# Patient Record
Sex: MALE | Race: WHITE | NOT HISPANIC OR LATINO | Employment: FULL TIME | URBAN - METROPOLITAN AREA
[De-identification: names, ages, dates, MRNs, and addresses within clinical notes are randomized per-mention and may not be internally consistent; named-entity substitution may affect disease eponyms.]

---

## 2023-12-12 ENCOUNTER — OFFICE VISIT (OUTPATIENT)
Dept: BEHAVIORAL HEALTH | Facility: CLINIC | Age: 58
End: 2023-12-12
Payer: COMMERCIAL

## 2023-12-12 VITALS
WEIGHT: 221.1 LBS | BODY MASS INDEX: 32.75 KG/M2 | HEIGHT: 69 IN | HEART RATE: 93 BPM | DIASTOLIC BLOOD PRESSURE: 76 MMHG | TEMPERATURE: 98.2 F | SYSTOLIC BLOOD PRESSURE: 115 MMHG | RESPIRATION RATE: 16 BRPM

## 2023-12-12 DIAGNOSIS — Z79.899 HIGH RISK MEDICATION USE: ICD-10-CM

## 2023-12-12 DIAGNOSIS — R47.9 SPEECH DISTURBANCE, UNSPECIFIED TYPE: ICD-10-CM

## 2023-12-12 DIAGNOSIS — F41.1 GAD (GENERALIZED ANXIETY DISORDER): ICD-10-CM

## 2023-12-12 DIAGNOSIS — F32.A DEPRESSION, UNSPECIFIED DEPRESSION TYPE: ICD-10-CM

## 2023-12-12 PROCEDURE — 99214 OFFICE O/P EST MOD 30 MIN: CPT

## 2023-12-12 RX ORDER — DULOXETIN HYDROCHLORIDE 60 MG/1
60 CAPSULE, DELAYED RELEASE ORAL DAILY
COMMUNITY
End: 2023-12-12 | Stop reason: SDUPTHER

## 2023-12-12 RX ORDER — CLONAZEPAM 2 MG/1
2 TABLET ORAL 2 TIMES DAILY
Qty: 60 TABLET | Refills: 1 | Status: SHIPPED | OUTPATIENT
Start: 2024-01-01 | End: 2024-02-13 | Stop reason: WASHOUT

## 2023-12-12 RX ORDER — CLONAZEPAM 2 MG/1
2 TABLET ORAL 2 TIMES DAILY
COMMUNITY
End: 2023-12-12 | Stop reason: SDUPTHER

## 2023-12-12 RX ORDER — DULOXETIN HYDROCHLORIDE 60 MG/1
60 CAPSULE, DELAYED RELEASE ORAL DAILY
Qty: 90 CAPSULE | Refills: 0 | Status: SHIPPED | OUTPATIENT
Start: 2023-12-12 | End: 2024-02-13 | Stop reason: SDUPTHER

## 2023-12-12 NOTE — PROGRESS NOTES
"Subjective   Patient ID: Esvin Gabriel is a 58 y.o. male who presents for Anxiety, Depression, and Med Management Last visit with this writer on 9/12/23.     HPI  When asked how he has been doing patient reports \"Anxiety's been running rampant\". Change in anxiety symptoms noted 3 weeks ago. Increasing stress reported at work - working over-time shifts. Joined [employer] and has established with associated [employer]. Seeing routinely which has been going well. Walking more often - trying to get back to exercise regiment at [employer]. \"Over thinking\" present intermittently - working on coping skills with counselor. Patient reports he wishes to continue as planned with reduction in clonazepam dose but is nervous about how this process will take place. Discussed that dose would be slowly reduced over-time with appointments to monitor progress and response which he finds reassuring.     Pain Improving with exercise - no impact on work. Intermittent N/T   Seeing an orthopedist for back pain/knee pain - epidural injections have been helpful.      -- MDD --   Mood: \"I'm okay\"   No anhedonia - enjoying time with grand kids and wife   Energy levels: adequate   Focus: adequate   Sleep: Has returned to third shift, sleeping well most days.   Appetite: stable - limiting caffeine and soda in general.   + weight gain  No hopelessness  No SI/HI      -- Anxiety --  + excessive worrying when not distracted   - \"doing a lot of stuff that keep me distracted\"   Restlessness/keyed up or on edge: endorses, intermittent   Irritability: intermittent   Sleep disturbance: denies   No specific panic attacks reported   Somatic complaints: infrequent eye burning/pain.      ETOH: denies   Tobacco - denies   MJ use- denies   Caffeine - 2-3 sodas daily   No Illicit drug use     Objective   Physical Exam  Constitutional:       Appearance: Normal appearance.   Neurological:      Mental Status: He is oriented to person, place, and time.     General " "Appearance: Well groomed, appropriate eye contact  Attitude/Behavior: Cooperative  Motor: No psychomotor agitation or retardation, no tremor or other abnormal movements  Speech: Normal rate, volume, prosody (+ disrupted speech fluency)  Gait/Station: WFL - Within functional limits  Mood: \"I'm okay\"  Affect: Constricted  Thought Process: Linear, goal directed, Circumstantial  Thought Associations: No loosening of associations  Thought Content: Normal  Perception: No perceptual abnormalities noted  Sensorium: Alert and oriented to person, place, time and situation  Insight: Fair  Judgement: Fair  Cognition: Cognitively intact to conversational testing with respect to attention, orientation, fund of knowledge, recent and remote memory, and language    Lab Review:   not applicable    Assessment/Plan   Problem List Items Addressed This Visit             ICD-10-CM    BETTY (generalized anxiety disorder) F41.1    Depression F32.A    Speech abnormality R47.9   Esvin Gabriel is a 57 year old male currently residing in [place], [place] w/ his mother.  from his wife w/ three adult children. 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 Initially sought care for depression and anxiety 4 years ago with Dr. Monge.      DSM-5 Diagnosis  BETTY  Depressive disorder, unspecified   H/o somatic symptoms   Speech abnormality      Current Medications   Duloxetine 60mg PO daily   Clonazepam 2mg BID - last filled on 12/3/23 for 60 tablets   - drug screen completed in November of 2022   - Completed benzo agreement - scanned into chart   - Reviewed [place]AR[place] on 12/12/23, no discrepancies or concerns noted.  taking Clonazepam on a strict routine - denies any cognitive issues at work or driving.   *medications continued unchanged from former provider Dr. Monge      - Anxiety symptoms fluctuating in frequency/intensity   - mood/sleep/appetite stable   - Patient and this writer mutually agreed to begin reducing " clonazepam dose to 3mg daily at next visit.   - Encouraged patient to continue working with counselor and developing coping skills over the next two months   - encouraged patient to continue making healthy choices  - c/w Duloxetine 60mg per day, c/w Clonazepam 2mg BID   - encouraged patient to communicate any questions, concerns or side effects if recognized. Follow up appointment in 2 months or sooner if needed  - patient is agreeable to the plan detailed above.  - patient is aware this provider is leaving  March 2024. He wishes to continue care with  psychiatry     Past medication trials  Gabapentin - not helpful   Side effects noted on higher doses Duloxetine  Buspar - stopped d/t side effects      Start time 11:32pm   End time 11:57am   Prep/charting time 7min  Total time 32min

## 2023-12-12 NOTE — PATIENT INSTRUCTIONS
Continue Duloxetine and Clonazepam as prescribed  Follow up visit in two months or sooner if needed  Please call the office with any questions or concerns

## 2024-02-12 ENCOUNTER — LAB (OUTPATIENT)
Dept: LAB | Facility: LAB | Age: 59
End: 2024-02-12
Payer: COMMERCIAL

## 2024-02-12 DIAGNOSIS — Z79.899 HIGH RISK MEDICATION USE: ICD-10-CM

## 2024-02-12 LAB
AMPHETAMINES UR QL SCN: NORMAL
BARBITURATES UR QL SCN: NORMAL
BENZODIAZ UR QL SCN: NORMAL
BZE UR QL SCN: NORMAL
CANNABINOIDS UR QL SCN: NORMAL
FENTANYL+NORFENTANYL UR QL SCN: NORMAL
OPIATES UR QL SCN: NORMAL
OXYCODONE+OXYMORPHONE UR QL SCN: NORMAL
PCP UR QL SCN: NORMAL

## 2024-02-12 PROCEDURE — 80307 DRUG TEST PRSMV CHEM ANLYZR: CPT

## 2024-02-12 PROCEDURE — 80346 BENZODIAZEPINES1-12: CPT

## 2024-02-13 ENCOUNTER — OFFICE VISIT (OUTPATIENT)
Dept: BEHAVIORAL HEALTH | Facility: CLINIC | Age: 59
End: 2024-02-13
Payer: COMMERCIAL

## 2024-02-13 VITALS
DIASTOLIC BLOOD PRESSURE: 76 MMHG | WEIGHT: 224.2 LBS | SYSTOLIC BLOOD PRESSURE: 132 MMHG | HEART RATE: 91 BPM | TEMPERATURE: 98.6 F | HEIGHT: 69 IN | RESPIRATION RATE: 16 BRPM | BODY MASS INDEX: 33.21 KG/M2

## 2024-02-13 DIAGNOSIS — F32.A DEPRESSION, UNSPECIFIED DEPRESSION TYPE: ICD-10-CM

## 2024-02-13 DIAGNOSIS — R47.9 SPEECH DISTURBANCE, UNSPECIFIED TYPE: ICD-10-CM

## 2024-02-13 DIAGNOSIS — F41.1 GAD (GENERALIZED ANXIETY DISORDER): ICD-10-CM

## 2024-02-13 PROCEDURE — 99213 OFFICE O/P EST LOW 20 MIN: CPT

## 2024-02-13 RX ORDER — CLONAZEPAM 2 MG/1
3 TABLET ORAL SEE ADMIN INSTRUCTIONS
Qty: 45 TABLET | Refills: 0 | Status: SHIPPED | OUTPATIENT
Start: 2024-04-11 | End: 2024-05-31 | Stop reason: ALTCHOICE

## 2024-02-13 RX ORDER — CLONAZEPAM 2 MG/1
3 TABLET ORAL SEE ADMIN INSTRUCTIONS
Qty: 45 TABLET | Refills: 0 | Status: SHIPPED | OUTPATIENT
Start: 2024-02-13 | End: 2024-05-31 | Stop reason: ALTCHOICE

## 2024-02-13 RX ORDER — DULOXETIN HYDROCHLORIDE 60 MG/1
60 CAPSULE, DELAYED RELEASE ORAL DAILY
Qty: 90 CAPSULE | Refills: 0 | Status: SHIPPED | OUTPATIENT
Start: 2024-02-13 | End: 2024-04-19 | Stop reason: SDUPTHER

## 2024-02-13 RX ORDER — CLONAZEPAM 2 MG/1
3 TABLET ORAL SEE ADMIN INSTRUCTIONS
Qty: 45 TABLET | Refills: 0 | Status: SHIPPED | OUTPATIENT
Start: 2024-03-13 | End: 2024-05-31 | Stop reason: ALTCHOICE

## 2024-02-13 NOTE — PROGRESS NOTES
"Subjective   Patient ID: Esvin Gabriel is a 58 y.o. male who presents for Anxiety, Depression, and Med Management Last visit with this writer on 12/12/23.    HPI  Patient arrived on-time for in-person visit. A/Ox3. When asked how he has been doing patient reports he is feeling \"okay\". He reports a recent fall at work while fixing a machine. Shoulder/wrist pain improving since. Back to work full-time. Taking OTC Advil every other day. Anxiety symptoms remain present but overall stable - \"Not stressing too much\".   T[facility] which has been helpful. Walking more often and doing pool exercises most weeks which has been helpful. Enjoying time with grand daughters. Patient reports he wishes to continue as planned with reduction in clonazepam dose. Discussed that dose would be slowly reduced over-time with appointments to monitor progress and response which he finds reassuring.     -- Depression --   Mood: \"pretty good\"   - brief period of irritability with conflict with son   No anhedonia - enjoying time with grand kids and wife   Energy levels: adequate   Focus: adequate   Sleep: disrupted sleep 2 nights/week the past 3 weeks   - working third shift   - difficultly falling asleep with pain/anxiety   Appetite: stable - limiting caffeine and soda in general.   + weight gain  No hopelessness  No SI/HI      -- Anxiety --  \"Pretty good\"   + excessive worrying/anxiety with stressors (conflict with son, stress at work, financial concerns, recent injury)  + Restlessness/keyed up or on edge: endorses, intermittent   + Irritability: infrequently   + Muscle tension   + Sleep disturbance: endorses   No specific panic attacks reported   Somatic complaints: infrequent eye burning/pain.      ETOH: denies   Tobacco - denies   MJ use- denies   Caffeine - 2-3 sodas daily   No Illicit drug use     Objective   Physical Exam  Constitutional:       Appearance: Normal appearance.   Neurological:      " "Mental Status: He is oriented to person, place, and time.     General Appearance: Well groomed, appropriate eye contact  Attitude/Behavior: Cooperative  Motor: No psychomotor agitation or retardation, no tremor or other abnormal movements  Speech: Other: (comment) (normal rate/volume. + disrupted fluency)  Gait/Station: WFL - Within functional limits  Mood: \"pretty good\"  Affect: Constricted  Thought Process: Linear, goal directed, Circumstantial  Thought Associations: No loosening of associations  Thought Content: Normal  Perception: No perceptual abnormalities noted  Sensorium: Alert and oriented to person, place, time and situation  Insight: Intact  Judgement: Intact  Cognition: Cognitively intact to conversational testing with respect to attention, orientation, fund of knowledge, recent and remote memory, and language    Lab Review:   not applicable    Assessment/Plan   Problem List Items Addressed This Visit             ICD-10-CM    BETTY (generalized anxiety disorder) F41.1    Depression F32.A    Speech abnormality R47.9   Esvin Gabriel is a 57 year old male currently residing in [place], [place] w/ his mother.  from his wife w/ three adult children. 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 Initially sought care for depression and anxiety 4 years ago with Dr. Monge.      DSM-5 Diagnosis  BETTY  Depressive disorder, unspecified   H/o somatic symptoms   Speech abnormality      Current Medications   Duloxetine 60mg PO daily   Clonazepam 2mg BID - last filled on 2/4/24 for 60tabs  - drug screen completed in 2/12/24   - Completed benzo agreement - scanned into chart (2023)   - Reviewed OARRS on 12/12/23, no discrepancies or concerns noted.  taking Clonazepam on a strict routine - denies any cognitive issues at work or driving.   *medications continued unchanged from former provider Dr. Monge      - Anxiety symptoms fluctuating in frequency/intensity with stressors but " overall stable. Functioning well at work and home.  - Mood is stable. No SI/HI.   - as previously discussed will reduce Clonazepam dose to 3mg total per day (1mg or half tablet in the am and full tablet/2mg in the PM)  - Patient is agreeable to reduction in dose and we discussed the importance of relying on coping skills as dose is reduced.   - Encouraged patient to continue working with counselor and developing coping skills.   - encouraged patient to continue making healthy choices  - c/w Duloxetine 60mg per day   - reduce clonazepam dose to 3mg daily in two divided doses  - discussed buying a pill cutter to accurately split morning tablet   - encouraged patient to communicate any questions, concerns or side effects if recognized.   - patient is aware this provider is leaving  March 2024. He wishes to continue care with  psychiatry, scheduled with Alba Phan   - patient is agreeable to plan detailed above.     Past medication trials  Gabapentin - not helpful   Side effects (fatigue, headaches) noted on higher doses Duloxetine  Buspar - stopped d/t side effects (headaches)     Start time 9:34am   End time 9:58am   Prep/charting time 5min   Total time 29min

## 2024-02-13 NOTE — PATIENT INSTRUCTIONS
Continue duloxetine 60mg daily   Reduce Clonazepam dose to 3mg total per day (1/2 tablet (1mg) in the morning and full tablet (2mg) in the evening)

## 2024-02-19 LAB
1OH-MIDAZOLAM UR CFM-MCNC: <25 NG/ML
7AMINOCLONAZEPAM UR CFM-MCNC: 172 NG/ML
A-OH ALPRAZ UR CFM-MCNC: <25 NG/ML
ALPRAZ UR CFM-MCNC: <25 NG/ML
CHLORDIAZEP UR CFM-MCNC: <25 NG/ML
CLONAZEPAM UR CFM-MCNC: <25 NG/ML
DIAZEPAM UR CFM-MCNC: <25 NG/ML
LORAZEPAM UR CFM-MCNC: <25 NG/ML
MIDAZOLAM UR CFM-MCNC: <25 NG/ML
NORDIAZEPAM UR CFM-MCNC: <25 NG/ML
OXAZEPAM UR CFM-MCNC: <25 NG/ML
TEMAZEPAM UR CFM-MCNC: <25 NG/ML

## 2024-04-18 ENCOUNTER — APPOINTMENT (OUTPATIENT)
Dept: BEHAVIORAL HEALTH | Facility: CLINIC | Age: 59
End: 2024-04-18
Payer: COMMERCIAL

## 2024-04-19 ENCOUNTER — OFFICE VISIT (OUTPATIENT)
Dept: BEHAVIORAL HEALTH | Facility: CLINIC | Age: 59
End: 2024-04-19
Payer: COMMERCIAL

## 2024-04-19 VITALS
HEART RATE: 81 BPM | DIASTOLIC BLOOD PRESSURE: 69 MMHG | BODY MASS INDEX: 32.98 KG/M2 | WEIGHT: 222.7 LBS | TEMPERATURE: 98.4 F | SYSTOLIC BLOOD PRESSURE: 105 MMHG | HEIGHT: 69 IN | RESPIRATION RATE: 16 BRPM

## 2024-04-19 DIAGNOSIS — F43.0 PANIC ATTACK DUE TO EXCEPTIONAL STRESS: Primary | ICD-10-CM

## 2024-04-19 DIAGNOSIS — F41.0 PANIC ATTACK DUE TO EXCEPTIONAL STRESS: Primary | ICD-10-CM

## 2024-04-19 DIAGNOSIS — F33.0 MILD EPISODE OF RECURRENT MAJOR DEPRESSIVE DISORDER (CMS-HCC): ICD-10-CM

## 2024-04-19 DIAGNOSIS — F42.8 OBSESSIVE THINKING: ICD-10-CM

## 2024-04-19 DIAGNOSIS — F43.10 COMPLEX POSTTRAUMATIC STRESS DISORDER: ICD-10-CM

## 2024-04-19 DIAGNOSIS — F41.1 GAD (GENERALIZED ANXIETY DISORDER): ICD-10-CM

## 2024-04-19 PROCEDURE — 99417 PROLNG OP E/M EACH 15 MIN: CPT | Performed by: NURSE PRACTITIONER

## 2024-04-19 PROCEDURE — 99215 OFFICE O/P EST HI 40 MIN: CPT | Performed by: NURSE PRACTITIONER

## 2024-04-19 RX ORDER — MULTIVITAMIN
1 TABLET ORAL DAILY
COMMUNITY
Start: 2022-10-20

## 2024-04-19 RX ORDER — CLONAZEPAM 2 MG/1
2 TABLET ORAL 2 TIMES DAILY
Qty: 60 TABLET | Refills: 0 | Status: SHIPPED | OUTPATIENT
Start: 2024-05-05 | End: 2024-05-31 | Stop reason: SDUPTHER

## 2024-04-19 RX ORDER — DULOXETIN HYDROCHLORIDE 60 MG/1
60 CAPSULE, DELAYED RELEASE ORAL DAILY
Qty: 90 CAPSULE | Refills: 0 | Status: SHIPPED | OUTPATIENT
Start: 2024-05-10 | End: 2024-05-31 | Stop reason: DRUGHIGH

## 2024-04-19 RX ORDER — DULOXETIN HYDROCHLORIDE 30 MG/1
30 CAPSULE, DELAYED RELEASE ORAL DAILY
Qty: 60 CAPSULE | Refills: 0 | Status: SHIPPED | OUTPATIENT
Start: 2024-04-19 | End: 2024-05-31 | Stop reason: DRUGHIGH

## 2024-04-19 ASSESSMENT — PATIENT HEALTH QUESTIONNAIRE - PHQ9
2. FEELING DOWN, DEPRESSED OR HOPELESS: SEVERAL DAYS
10. IF YOU CHECKED OFF ANY PROBLEMS, HOW DIFFICULT HAVE THESE PROBLEMS MADE IT FOR YOU TO DO YOUR WORK, TAKE CARE OF THINGS AT HOME, OR GET ALONG WITH OTHER PEOPLE: NOT DIFFICULT AT ALL
7. TROUBLE CONCENTRATING ON THINGS, SUCH AS READING THE NEWSPAPER OR WATCHING TELEVISION: SEVERAL DAYS
4. FEELING TIRED OR HAVING LITTLE ENERGY: NEARLY EVERY DAY
9. THOUGHTS THAT YOU WOULD BE BETTER OFF DEAD, OR OF HURTING YOURSELF: NOT AT ALL
6. FEELING BAD ABOUT YOURSELF - OR THAT YOU ARE A FAILURE OR HAVE LET YOURSELF OR YOUR FAMILY DOWN: NOT AT ALL
8. MOVING OR SPEAKING SO SLOWLY THAT OTHER PEOPLE COULD HAVE NOTICED. OR THE OPPOSITE, BEING SO FIGETY OR RESTLESS THAT YOU HAVE BEEN MOVING AROUND A LOT MORE THAN USUAL: MORE THAN HALF THE DAYS
1. LITTLE INTEREST OR PLEASURE IN DOING THINGS: MORE THAN HALF THE DAYS
5. POOR APPETITE OR OVEREATING: NOT AT ALL
3. TROUBLE FALLING OR STAYING ASLEEP OR SLEEPING TOO MUCH: MORE THAN HALF THE DAYS

## 2024-04-19 ASSESSMENT — ANXIETY QUESTIONNAIRES
6. BECOMING EASILY ANNOYED OR IRRITABLE: NEARLY EVERY DAY
7. FEELING AFRAID AS IF SOMETHING AWFUL MIGHT HAPPEN: MORE THAN HALF THE DAYS
3. WORRYING TOO MUCH ABOUT DIFFERENT THINGS: NEARLY EVERY DAY
1. FEELING NERVOUS, ANXIOUS, OR ON EDGE: MORE THAN HALF THE DAYS
4. TROUBLE RELAXING: NEARLY EVERY DAY
GAD7 TOTAL SCORE: 17
2. NOT BEING ABLE TO STOP OR CONTROL WORRYING: MORE THAN HALF THE DAYS
IF YOU CHECKED OFF ANY PROBLEMS ON THIS QUESTIONNAIRE, HOW DIFFICULT HAVE THESE PROBLEMS MADE IT FOR YOU TO DO YOUR WORK, TAKE CARE OF THINGS AT HOME, OR GET ALONG WITH OTHER PEOPLE: SOMEWHAT DIFFICULT
5. BEING SO RESTLESS THAT IT IS HARD TO SIT STILL: MORE THAN HALF THE DAYS
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
## 2024-04-21 PROBLEM — F43.0 PANIC ATTACK DUE TO EXCEPTIONAL STRESS: Status: ACTIVE | Noted: 2024-04-21

## 2024-04-21 PROBLEM — F41.0 PANIC ATTACK DUE TO EXCEPTIONAL STRESS: Status: ACTIVE | Noted: 2024-04-21

## 2024-04-21 PROBLEM — F43.10 COMPLEX POSTTRAUMATIC STRESS DISORDER: Status: ACTIVE | Noted: 2024-04-21

## 2024-04-21 PROBLEM — F42.8 OBSESSIVE THINKING: Status: ACTIVE | Noted: 2024-04-21

## 2024-04-21 ASSESSMENT — ENCOUNTER SYMPTOMS
MUSCULOSKELETAL NEGATIVE: 1
CARDIOVASCULAR NEGATIVE: 1
NERVOUS/ANXIOUS: 1
GASTROINTESTINAL NEGATIVE: 1

## 2024-05-02 ENCOUNTER — APPOINTMENT (OUTPATIENT)
Dept: BEHAVIORAL HEALTH | Facility: CLINIC | Age: 59
End: 2024-05-02
Payer: COMMERCIAL

## 2024-05-31 ENCOUNTER — TELEMEDICINE (OUTPATIENT)
Dept: BEHAVIORAL HEALTH | Facility: CLINIC | Age: 59
End: 2024-05-31
Payer: COMMERCIAL

## 2024-05-31 VITALS
SYSTOLIC BLOOD PRESSURE: 116 MMHG | DIASTOLIC BLOOD PRESSURE: 71 MMHG | TEMPERATURE: 98.2 F | WEIGHT: 222.9 LBS | RESPIRATION RATE: 16 BRPM | HEART RATE: 79 BPM | BODY MASS INDEX: 33.02 KG/M2 | HEIGHT: 69 IN

## 2024-05-31 DIAGNOSIS — F43.10 COMPLEX POSTTRAUMATIC STRESS DISORDER: ICD-10-CM

## 2024-05-31 DIAGNOSIS — F41.0 PANIC ATTACK DUE TO EXCEPTIONAL STRESS: ICD-10-CM

## 2024-05-31 DIAGNOSIS — F43.0 PANIC ATTACK DUE TO EXCEPTIONAL STRESS: ICD-10-CM

## 2024-05-31 DIAGNOSIS — F33.0 MILD EPISODE OF RECURRENT MAJOR DEPRESSIVE DISORDER (CMS-HCC): ICD-10-CM

## 2024-05-31 DIAGNOSIS — F41.1 GAD (GENERALIZED ANXIETY DISORDER): Primary | ICD-10-CM

## 2024-05-31 DIAGNOSIS — F42.8 OBSESSIVE THINKING: ICD-10-CM

## 2024-05-31 PROBLEM — F32.A CHRONIC DEPRESSION: Status: RESOLVED | Noted: 2023-12-12 | Resolved: 2024-05-31

## 2024-05-31 LAB
AMPHETAMINES UR QL SCN: NORMAL
BARBITURATES UR QL SCN: NORMAL
BZE UR QL SCN: NORMAL
CANNABINOIDS UR QL SCN: NORMAL
CREAT UR-MCNC: 188.4 MG/DL (ref 20–370)
PCP UR QL SCN: NORMAL

## 2024-05-31 PROCEDURE — 80307 DRUG TEST PRSMV CHEM ANLYZR: CPT

## 2024-05-31 PROCEDURE — 80358 DRUG SCREENING METHADONE: CPT

## 2024-05-31 PROCEDURE — 82570 ASSAY OF URINE CREATININE: CPT

## 2024-05-31 PROCEDURE — 99214 OFFICE O/P EST MOD 30 MIN: CPT | Performed by: NURSE PRACTITIONER

## 2024-05-31 PROCEDURE — 80368 SEDATIVE HYPNOTICS: CPT

## 2024-05-31 PROCEDURE — 80354 DRUG SCREENING FENTANYL: CPT

## 2024-05-31 PROCEDURE — 80361 OPIATES 1 OR MORE: CPT

## 2024-05-31 PROCEDURE — 80373 DRUG SCREENING TRAMADOL: CPT

## 2024-05-31 PROCEDURE — 80346 BENZODIAZEPINES1-12: CPT

## 2024-05-31 PROCEDURE — 80365 DRUG SCREENING OXYCODONE: CPT

## 2024-05-31 RX ORDER — DULOXETIN HYDROCHLORIDE 20 MG/1
20 CAPSULE, DELAYED RELEASE ORAL DAILY
Qty: 60 CAPSULE | Refills: 0 | Status: SHIPPED | OUTPATIENT
Start: 2024-05-31 | End: 2024-07-30

## 2024-05-31 RX ORDER — CLONAZEPAM 2 MG/1
2 TABLET ORAL 2 TIMES DAILY
Qty: 60 TABLET | Refills: 1 | Status: SHIPPED | OUTPATIENT
Start: 2024-06-04 | End: 2024-08-03

## 2024-05-31 RX ORDER — DULOXETIN HYDROCHLORIDE 60 MG/1
60 CAPSULE, DELAYED RELEASE ORAL DAILY
Qty: 90 CAPSULE | Refills: 3 | Status: SHIPPED | OUTPATIENT
Start: 2024-05-31 | End: 2025-05-31

## 2024-05-31 ASSESSMENT — PATIENT HEALTH QUESTIONNAIRE - PHQ9
5. POOR APPETITE OR OVEREATING: SEVERAL DAYS
2. FEELING DOWN, DEPRESSED OR HOPELESS: MORE THAN HALF THE DAYS
9. THOUGHTS THAT YOU WOULD BE BETTER OFF DEAD, OR OF HURTING YOURSELF: NOT AT ALL
1. LITTLE INTEREST OR PLEASURE IN DOING THINGS: SEVERAL DAYS
10. IF YOU CHECKED OFF ANY PROBLEMS, HOW DIFFICULT HAVE THESE PROBLEMS MADE IT FOR YOU TO DO YOUR WORK, TAKE CARE OF THINGS AT HOME, OR GET ALONG WITH OTHER PEOPLE: SOMEWHAT DIFFICULT
3. TROUBLE FALLING OR STAYING ASLEEP OR SLEEPING TOO MUCH: NEARLY EVERY DAY
7. TROUBLE CONCENTRATING ON THINGS, SUCH AS READING THE NEWSPAPER OR WATCHING TELEVISION: SEVERAL DAYS
6. FEELING BAD ABOUT YOURSELF - OR THAT YOU ARE A FAILURE OR HAVE LET YOURSELF OR YOUR FAMILY DOWN: NOT AT ALL
4. FEELING TIRED OR HAVING LITTLE ENERGY: SEVERAL DAYS
8. MOVING OR SPEAKING SO SLOWLY THAT OTHER PEOPLE COULD HAVE NOTICED. OR THE OPPOSITE, BEING SO FIGETY OR RESTLESS THAT YOU HAVE BEEN MOVING AROUND A LOT MORE THAN USUAL: SEVERAL DAYS

## 2024-05-31 ASSESSMENT — ANXIETY QUESTIONNAIRES
GAD7 TOTAL SCORE: 13
IF YOU CHECKED OFF ANY PROBLEMS ON THIS QUESTIONNAIRE, HOW DIFFICULT HAVE THESE PROBLEMS MADE IT FOR YOU TO DO YOUR WORK, TAKE CARE OF THINGS AT HOME, OR GET ALONG WITH OTHER PEOPLE: SOMEWHAT DIFFICULT
5. BEING SO RESTLESS THAT IT IS HARD TO SIT STILL: MORE THAN HALF THE DAYS
7. FEELING AFRAID AS IF SOMETHING AWFUL MIGHT HAPPEN: SEVERAL DAYS
3. WORRYING TOO MUCH ABOUT DIFFERENT THINGS: NEARLY EVERY DAY
6. BECOMING EASILY ANNOYED OR IRRITABLE: SEVERAL DAYS
4. TROUBLE RELAXING: MORE THAN HALF THE DAYS
1. FEELING NERVOUS, ANXIOUS, OR ON EDGE: SEVERAL DAYS
2. NOT BEING ABLE TO STOP OR CONTROL WORRYING: NEARLY EVERY DAY

## 2024-05-31 ASSESSMENT — ENCOUNTER SYMPTOMS
NERVOUS/ANXIOUS: 1
CONSTITUTIONAL NEGATIVE: 1
DYSPHORIC MOOD: 1
AGITATION: 1

## 2024-05-31 NOTE — PROGRESS NOTES
"Adult Ambulatory Psychiatry Progress Note      Assessment/Plan     Impression:  Esvin Gabriel is a 58 y.o. male domiciled  (living separately) with 3 adult sons, employed as  who presents for follow up with CC of \"  I feel like the increase Duloxetine has left me feeling more jittery - not more nervous, but more like clenching or a feeling of biting down my teeth. I didn't have this problem on the lower dose. Other than that I have been feeling fine. I am walking more and have lost 2 lbs, me and my son and I do try to do that twice a week and that feels good.\"    Plan: Patient was cooperative yet anxious, edgy, rapidly speaking and needing interruptions and redirection. Indicated increased dose of Cymbalta improved his mood but left him feeling on edge, tight (clenching his teeth was a side effect) and did not like that feeling. Reviewed and agreed to reducing dose of Cymbalta and patient is onboard with slowly tapering Klonopin over time still but will review starting that process at next appt. No other changes to treatment plan.     Medication: Reduces Cymbalta 90mg to 80mg every day. Continues taking Klonopin 2mg twice daily for panic disorder.    Reviewed r/b/a, possible side effects of the medication. Client is aware about the benefit outweighs the risk.     Diagnoses and all orders for this visit:  BETTY (generalized anxiety disorder)  -     DULoxetine (Cymbalta) 20 mg DR capsule; Take 1 capsule (20 mg) by mouth once daily. Do not crush or chew.  -     DULoxetine (Cymbalta) 60 mg DR capsule; Take 1 capsule (60 mg) by mouth once daily.  Panic attack due to exceptional stress  -     Opiate/Opioid/Benzo Prescription Compliance  -     OOB Internal Tracking  -     clonazePAM (KlonoPIN) 2 mg tablet; Take 1 tablet (2 mg) by mouth 2 times a day. Do not fill before June 4, 2024.  Mild episode of recurrent major depressive disorder (CMS-HCC)  -     DULoxetine (Cymbalta) 20 mg DR capsule; Take 1 " capsule (20 mg) by mouth once daily. Do not crush or chew.  -     DULoxetine (Cymbalta) 60 mg DR capsule; Take 1 capsule (60 mg) by mouth once daily.  Obsessive thinking  -     DULoxetine (Cymbalta) 20 mg DR capsule; Take 1 capsule (20 mg) by mouth once daily. Do not crush or chew.  -     DULoxetine (Cymbalta) 60 mg DR capsule; Take 1 capsule (60 mg) by mouth once daily.  Complex posttraumatic stress disorder      Therapy: none    Other: n/a    Patient is reminded that if there is SI to call [phone] and get themselves to the closest ED for evaluation, otherwise contact me for other questions/concerns.     Subjective   HPI:  HPI:     Present Illness - anxiety, depression       Characteristics/Recent psychiatric symptoms (pertinent positives and negatives) - reports loving his grandkids as 'they help me forget everything. They run me ragged but that is why I want to come down on the Klonopin as I get older.' Admits the medications he is on, have been a game changer for him, especially the Klonopin however. Reports having been on it, people at work noticed how much calmer he has been with how he treats others, and including the Cymbalta as having been added on latter. Reports both depression and anxiety are overall stable with medications but 'some days can be triggered. Like my one son has issues with his job and that can bother me and I realize I can't worry about my kids problems as that is theirs to worry and fix.' Reports with working 3rd shift at work, he will get 7 hours of sleep (going to bed around 9am to 4:30pm) and 'honestly I don't feel too tired and I do get rested'. Reports his goal is to leave his job in July 2025 as he has been there for 30 years and wants to retire. Appetite is 'good'. Denies anger, but can get irritated over the small things like his 2 sons. Does admit to racing and some time obsessive thoughts with worrying about his youngest grown son's issues but is trying to let it go, 'but otherwise  I feel I am honestly good with that.' Reports still with his wife and working on their relationship, but she lives in their home and he lives with his mother.     Onset/timeframe - 3 weeks  Type - anxiety, depression  Duration - situational  Aggravating and/or relieving factors/triggers - increased dose of Cymbalta is helping with mood, but leaves him feeling more physically agitated (clenching of teeth)  Treatment and treatment changes (new meds, dosage increases or decreases, med compliance, therapy frequency, etc.) (Past and Recent) - Cymbalta 90mg QD, Klonopin 2mg BID. Reports having tried therapy (psychodynamic therapy) but gave up 'because people were never understanding me, not being in my shoes'.      Issues: Denies SI/HI/AVH currently.           Review of Systems   Constitutional: Negative.    HENT: Negative.     Psychiatric/Behavioral:  Positive for agitation and dysphoric mood. The patient is nervous/anxious.        OARRS:  Adarsh Robles, APRN-CNP on 5/31/2024 11:26 PM  I have personally reviewed the OARRS report for Esvin Gabriel. I have considered the risks of abuse, dependence, addiction and diversion    Is the patient prescribed a combination of a benzodiazepine and opioid?  No    Last Urine Drug Screen / ordered today: Yes  Recent Results (from the past 8760 hour(s))   Screen Opiate/Opioid/Benzo Prescription Compliance    Collection Time: 05/31/24  2:19 PM   Result Value Ref Range    Creatinine, Urine Random 188.4 20.0 - 370.0 mg/dL    Amphetamine Screen, Urine Presumptive Negative Presumptive Negative    Barbiturate Screen, Urine Presumptive Negative Presumptive Negative    Cannabinoid Screen, Urine Presumptive Negative Presumptive Negative    Cocaine Metabolite Screen, Urine Presumptive Negative Presumptive Negative    PCP Screen, Urine Presumptive Negative Presumptive Negative   Benzodiazepine Confirmation, Urine    Collection Time: 02/12/24  8:18 AM   Result Value Ref Range    Clonazepam <25 <25  ng/mL    7-Aminoclonazepam 172 (H) <25 ng/mL    Alprazolam <25 <25 ng/mL    Alpha-Hydroxyalprazolam <25 <25 ng/mL    Midazolam <25 <25 ng/mL    Alpha-Hydroxymidazolam <25 <25 ng/mL    Chlordiazepoxide <25 <25 ng/mL    Diazepam <25 <25 ng/mL    Nordiazepam <25 <25 ng/mL    Temazepam <25 <25 ng/mL    Oxazepam <25 <25 ng/mL    Lorazepam <25 <25 ng/mL   Drug Screen, Urine With Reflex to Confirmation    Collection Time: 02/12/24  8:18 AM   Result Value Ref Range    Amphetamine Screen, Urine Presumptive Negative Presumptive Negative    Barbiturate Screen, Urine Presumptive Negative Presumptive Negative    Benzodiazepines Screen, Urine Presumptive Negative Presumptive Negative    Cannabinoid Screen, Urine Presumptive Negative Presumptive Negative    Cocaine Metabolite Screen, Urine Presumptive Negative Presumptive Negative    Fentanyl Screen, Urine Presumptive Negative Presumptive Negative    Opiate Screen, Urine Presumptive Negative Presumptive Negative    Oxycodone Screen, Urine Presumptive Negative Presumptive Negative    PCP Screen, Urine Presumptive Negative Presumptive Negative     Results are as expected.         Controlled Substance Agreement:  Date of the Last Agreement: 04/19/2024  Reviewed Controlled Substance Agreement including but not limited to the benefits, risks, and alternatives to treatment with a Controlled Substance medication(s).    Benzodiazepines:  What is the patient's goal of therapy? Stable anxiety/panic feeling  Is this being achieved with current treatment? yes    Activities of Daily Living:   Is your overall impression that this patient is benefiting (symptom reduction outweighs side effects) from benzodiazepine therapy? Yes     1. Physical Functioning: Better  2. Family Relationship: Better  3. Social Relationship: Better  4. Mood: Same  5. Sleep Patterns: Better  6. Overall Function: Better    Objective   Mental Status Exam:  General Appearance: Well groomed, appropriate eye  contact  Attitude/Behavior: Guarded, Superficially cooperative, Ingratiating, Distracted, Fair eye contact  Motor: Psychomotor agitation, Fidgeting  Speech: Rapid Speech, pressured, Other: (comment) (perseverative requiring frequent interruption and redirection)  Gait/Station: WFL - Within functional limits  Mood: 'better yet edgy'  Affect: Euthymic, full-range, Increased intensity, Anxious, Congruent with mood and topic of conversation  Thought Process: Linear, goal directed, Perseverative, Flight of ideas  Thought Associations: No loosening of associations  Thought Content: Tangential, Normal  Perception: No perceptual abnormalities noted  Sensorium: Alert and oriented to person, place, time and situation  Insight: Fair  Judgement: Fair  Cognition: Cognitively intact to conversational testing with respect to attention, orientation, fund of knowledge, recent and remote memory, and language  Testing: N/A  BETTY-7/PHQ-9 scores reviewed: 13, 10 compared to 17, 11 reflecting some improvement in both anxiety and depression.  Vitals:  Vitals:    05/31/24 1344   BP: 116/71   Pulse: 79   Resp: 16   Temp: 36.8 °C (98.2 °F)       Current Medications:  Current Outpatient Medications on File Prior to Visit   Medication Sig Dispense Refill    multivitamin tablet Take 1 tablet by mouth once daily. Equate from [employer]      [DISCONTINUED] clonazePAM (KlonoPIN) 2 mg tablet Take 1 tablet (2 mg) by mouth 2 times a day. Do not start before May 5, 2024. 60 tablet 0    [DISCONTINUED] DULoxetine (Cymbalta) 30 mg DR capsule Take 1 capsule (30 mg) by mouth once daily. To be taken with current dose of 60mg for a total of 90mg. Do not crush or chew. 60 capsule 0    [DISCONTINUED] DULoxetine (Cymbalta) 60 mg DR capsule Take 1 capsule (60 mg) by mouth once daily. Do not start before May 10, 2024. 90 capsule 0    [DISCONTINUED] clonazePAM (KlonoPIN) 2 mg tablet Take 1.5 tablets (3 mg) by mouth see administration instructions. Half tablet (1mg)  every morning and full tablet (2mg) every evening for a daily total of 3mg (Patient not taking: Reported on 5/31/2024) 45 tablet 0    [DISCONTINUED] clonazePAM (KlonoPIN) 2 mg tablet Take 1.5 tablets (3 mg) by mouth see administration instructions. Half tablet (1mg) every morning and full tablet (2mg) every evening for a daily total of 3mg Do not start before March 13, 2024. (Patient not taking: Reported on 5/31/2024) 45 tablet 0    [DISCONTINUED] clonazePAM (KlonoPIN) 2 mg tablet Take 1.5 tablets (3 mg) by mouth see administration instructions. Half tablet (1mg) every morning and full tablet (2mg) every evening for a daily total of 3mg Do not start before April 11, 2024. (Patient not taking: Reported on 5/31/2024) 45 tablet 0     No current facility-administered medications on file prior to visit.       Lab Review:   No visits with results within 2 Month(s) from this visit.   Latest known visit with results is:   Lab on 02/12/2024   Component Date Value    Amphetamine Screen, Urine 02/12/2024 Presumptive Negative     Barbiturate Screen, Urine 02/12/2024 Presumptive Negative     Benzodiazepines Screen, * 02/12/2024 Presumptive Negative     Cannabinoid Screen, Urine 02/12/2024 Presumptive Negative     Cocaine Metabolite Scree* 02/12/2024 Presumptive Negative     Fentanyl Screen, Urine 02/12/2024 Presumptive Negative     Opiate Screen, Urine 02/12/2024 Presumptive Negative     Oxycodone Screen, Urine 02/12/2024 Presumptive Negative     PCP Screen, Urine 02/12/2024 Presumptive Negative     Clonazepam 02/12/2024 <25     7-Aminoclonazepam 02/12/2024 172 (H)     Alprazolam 02/12/2024 <25     Alpha-Hydroxyalprazolam 02/12/2024 <25     Midazolam 02/12/2024 <25     Alpha-Hydroxymidazolam 02/12/2024 <25     Chlordiazepoxide 02/12/2024 <25     Diazepam 02/12/2024 <25     Nordiazepam 02/12/2024 <25     Temazepam 02/12/2024 <25     Oxazepam 02/12/2024 <25     Lorazepam 02/12/2024 <25          Time Spent:    Prep time: 1  min.  Direct patient time: 31 min.  Documentation time: 6 min.  Total time: 38 min.    Next Appointment:  Follow up in about 2 months (around 7/31/2024).

## 2024-06-06 LAB
1OH-MIDAZOLAM UR CFM-MCNC: <25 NG/ML
6MAM UR CFM-MCNC: <25 NG/ML
7AMINOCLONAZEPAM UR CFM-MCNC: 232 NG/ML
A-OH ALPRAZ UR CFM-MCNC: <25 NG/ML
ALPRAZ UR CFM-MCNC: <25 NG/ML
CHLORDIAZEP UR CFM-MCNC: <25 NG/ML
CLONAZEPAM UR CFM-MCNC: <25 NG/ML
CODEINE UR CFM-MCNC: <50 NG/ML
DIAZEPAM UR CFM-MCNC: <25 NG/ML
EDDP UR CFM-MCNC: <25 NG/ML
FENTANYL UR CFM-MCNC: <2.5 NG/ML
HYDROCODONE CTO UR CFM-MCNC: <25 NG/ML
HYDROMORPHONE UR CFM-MCNC: <25 NG/ML
LORAZEPAM UR CFM-MCNC: <25 NG/ML
METHADONE UR CFM-MCNC: <25 NG/ML
MIDAZOLAM UR CFM-MCNC: <25 NG/ML
MORPHINE UR CFM-MCNC: <50 NG/ML
NORDIAZEPAM UR CFM-MCNC: <25 NG/ML
NORFENTANYL UR CFM-MCNC: <2.5 NG/ML
NORHYDROCODONE UR CFM-MCNC: <25 NG/ML
NOROXYCODONE UR CFM-MCNC: <25 NG/ML
NORTRAMADOL UR-MCNC: <50 NG/ML
OXAZEPAM UR CFM-MCNC: <25 NG/ML
OXYCODONE UR CFM-MCNC: <25 NG/ML
OXYMORPHONE UR CFM-MCNC: <25 NG/ML
TEMAZEPAM UR CFM-MCNC: <25 NG/ML
TRAMADOL UR CFM-MCNC: <50 NG/ML
ZOLPIDEM UR CFM-MCNC: <25 NG/ML
ZOLPIDEM UR-MCNC: <25 NG/ML

## 2024-07-26 ENCOUNTER — APPOINTMENT (OUTPATIENT)
Dept: BEHAVIORAL HEALTH | Facility: CLINIC | Age: 59
End: 2024-07-26
Payer: COMMERCIAL

## 2024-07-26 VITALS
HEART RATE: 90 BPM | HEIGHT: 69 IN | TEMPERATURE: 98.7 F | BODY MASS INDEX: 33.5 KG/M2 | DIASTOLIC BLOOD PRESSURE: 77 MMHG | SYSTOLIC BLOOD PRESSURE: 134 MMHG | RESPIRATION RATE: 16 BRPM | WEIGHT: 226.2 LBS

## 2024-07-26 DIAGNOSIS — F42.8 OBSESSIVE THINKING: ICD-10-CM

## 2024-07-26 DIAGNOSIS — F43.10 COMPLEX POSTTRAUMATIC STRESS DISORDER: ICD-10-CM

## 2024-07-26 DIAGNOSIS — F33.0 MILD EPISODE OF RECURRENT MAJOR DEPRESSIVE DISORDER (CMS-HCC): ICD-10-CM

## 2024-07-26 DIAGNOSIS — F41.0 PANIC ATTACK DUE TO EXCEPTIONAL STRESS: ICD-10-CM

## 2024-07-26 DIAGNOSIS — F43.0 PANIC ATTACK DUE TO EXCEPTIONAL STRESS: ICD-10-CM

## 2024-07-26 DIAGNOSIS — F41.1 GAD (GENERALIZED ANXIETY DISORDER): Primary | ICD-10-CM

## 2024-07-26 PROCEDURE — 99214 OFFICE O/P EST MOD 30 MIN: CPT | Performed by: NURSE PRACTITIONER

## 2024-07-26 PROCEDURE — 3008F BODY MASS INDEX DOCD: CPT | Performed by: NURSE PRACTITIONER

## 2024-07-26 RX ORDER — DULOXETIN HYDROCHLORIDE 30 MG/1
60 CAPSULE, DELAYED RELEASE ORAL DAILY
Qty: 180 CAPSULE | Refills: 3 | Status: SHIPPED | OUTPATIENT
Start: 2024-07-26 | End: 2025-07-26

## 2024-07-26 RX ORDER — DULOXETIN HYDROCHLORIDE 30 MG/1
30 CAPSULE, DELAYED RELEASE ORAL DAILY
COMMUNITY
Start: 2024-05-31 | End: 2024-07-26 | Stop reason: SDUPTHER

## 2024-07-26 ASSESSMENT — ENCOUNTER SYMPTOMS
CONSTITUTIONAL NEGATIVE: 1
NERVOUS/ANXIOUS: 1
DYSPHORIC MOOD: 1
AGITATION: 1

## 2024-07-26 ASSESSMENT — ANXIETY QUESTIONNAIRES
IF YOU CHECKED OFF ANY PROBLEMS ON THIS QUESTIONNAIRE, HOW DIFFICULT HAVE THESE PROBLEMS MADE IT FOR YOU TO DO YOUR WORK, TAKE CARE OF THINGS AT HOME, OR GET ALONG WITH OTHER PEOPLE: VERY DIFFICULT
4. TROUBLE RELAXING: MORE THAN HALF THE DAYS
6. BECOMING EASILY ANNOYED OR IRRITABLE: NEARLY EVERY DAY
3. WORRYING TOO MUCH ABOUT DIFFERENT THINGS: NEARLY EVERY DAY
7. FEELING AFRAID AS IF SOMETHING AWFUL MIGHT HAPPEN: MORE THAN HALF THE DAYS
GAD7 TOTAL SCORE: 18
1. FEELING NERVOUS, ANXIOUS, OR ON EDGE: NEARLY EVERY DAY
5. BEING SO RESTLESS THAT IT IS HARD TO SIT STILL: MORE THAN HALF THE DAYS
2. NOT BEING ABLE TO STOP OR CONTROL WORRYING: NEARLY EVERY DAY

## 2024-07-26 ASSESSMENT — PATIENT HEALTH QUESTIONNAIRE - PHQ9
1. LITTLE INTEREST OR PLEASURE IN DOING THINGS: SEVERAL DAYS
5. POOR APPETITE OR OVEREATING: NEARLY EVERY DAY
2. FEELING DOWN, DEPRESSED OR HOPELESS: SEVERAL DAYS
9. THOUGHTS THAT YOU WOULD BE BETTER OFF DEAD, OR OF HURTING YOURSELF: NOT AT ALL
8. MOVING OR SPEAKING SO SLOWLY THAT OTHER PEOPLE COULD HAVE NOTICED. OR THE OPPOSITE, BEING SO FIGETY OR RESTLESS THAT YOU HAVE BEEN MOVING AROUND A LOT MORE THAN USUAL: NEARLY EVERY DAY
4. FEELING TIRED OR HAVING LITTLE ENERGY: SEVERAL DAYS
3. TROUBLE FALLING OR STAYING ASLEEP OR SLEEPING TOO MUCH: MORE THAN HALF THE DAYS
7. TROUBLE CONCENTRATING ON THINGS, SUCH AS READING THE NEWSPAPER OR WATCHING TELEVISION: NOT AT ALL
10. IF YOU CHECKED OFF ANY PROBLEMS, HOW DIFFICULT HAVE THESE PROBLEMS MADE IT FOR YOU TO DO YOUR WORK, TAKE CARE OF THINGS AT HOME, OR GET ALONG WITH OTHER PEOPLE: SOMEWHAT DIFFICULT
6. FEELING BAD ABOUT YOURSELF - OR THAT YOU ARE A FAILURE OR HAVE LET YOURSELF OR YOUR FAMILY DOWN: NOT AT ALL

## 2024-07-26 NOTE — PROGRESS NOTES
"Adult Ambulatory Psychiatry Progress Note      Assessment/Plan     Impression:  Esvin Gabriel is a 59 y.o. male domiciled  (living separately) with 3 adult sons, employed as  who presents for follow up with CC of \"I was having issues since I never got the change with my Duloxetine as the pharmacy said they never got the new scripts. I called the office twice and spoke to a nurse and was told they would call back, and I never heard back from them. It was weird that the Klonopin went through but not that. So I did continue taking at least a 20mg and 30mg and stay at 50mg. It was not good being on a lower dose, and I had a few bad episode and I don't know what triggered 2 panic attack episodes just after our last appt. I stayed in bed for 2 days.\"    Plan: Patient was cooperative yet while less anxious than usual, still edgy, and some rapid speech. Revealed the drop in Cymbalta dose per pharmacy was never received by them. Discussed with patient that the pharmacy never contacted this provider or heard from staff of being contacted so directed patient to learn how to use [employer] for direct messaging. Reviewed and agreed to increasing dose of Cymbalta slightly to improve anxiety symptoms, without intention of causing his teeth clenching at higher dose. No other changes to Klonopin or treatment plan.     Medication: Increases Cymbalta 50mg to 60mg every day. Continues taking Klonopin 2mg twice daily for panic disorder.    Reviewed r/b/a, possible side effects of the medication. Client is aware about the benefit outweighs the risk.     Diagnoses and all orders for this visit:  BETTY (generalized anxiety disorder)  -     DULoxetine (Cymbalta) 30 mg DR capsule; Take 2 capsules (60 mg) by mouth once daily.  Obsessive thinking  -     DULoxetine (Cymbalta) 30 mg DR capsule; Take 2 capsules (60 mg) by mouth once daily.  Mild episode of recurrent major depressive disorder (CMS-HCC)  -     DULoxetine (Cymbalta) " 30 mg DR capsule; Take 2 capsules (60 mg) by mouth once daily.  Complex posttraumatic stress disorder  -     DULoxetine (Cymbalta) 30 mg DR capsule; Take 2 capsules (60 mg) by mouth once daily.  Panic attack due to exceptional stress        Therapy: none    Other: n/a    Patient is reminded that if there is SI to call [phone] and get themselves to the closest ED for evaluation, otherwise contact me for other questions/concerns.     Subjective   HPI:  HPI:     Present Illness - anxiety, depression       Characteristics/Recent psychiatric symptoms (pertinent positives and negatives) - reports 6 days after the last appt, because the pharmacy reportedly didn't get the reduced Cymbalta dose, felt himself experience a huge panic attack but also underlying anxiety increased, and depression returned where he hid under the covers and stayed in his bedroom for 2 days straight, feeling down. Reports couldn't figure out what happened, but was glad he had 20mg and 30mg Cymbalta still at home and took those for a total of 50mg daily instead. Reports he had stabilized since then, but also indicated that he had called the offices twice and talked to nursing staff whom indicated they would pass on the message the issues with the script and never heard back. Currently feels 'ok the 50mg but I feel like I could do better.' Reports issues with anxiety are tied to his one son whom he loves but he sees his son's behaviors to be very not fatherly like and needs to take on his duties as a father, when he needs to step up and not have the patient or his wife do the work (aka wash their grandkids when he is perfectly able to do so). Reports loves his kids, his grandkids and likes his work. Reports depression is only slightly worse than before. Reports with working 3rd shift at work, he will get 7 hours of sleep (going to bed around 9am to 4:30pm). Reports energy is 'pretty good' and fatigue is more noticeable physically 'but not too often' and  denies mental issues. Appetite is 'me eating more. So I am eating more out like Amelie Cabrera because I go out with my granddaughter, but I am still good'. Denies anger, but can get irritated over the small things like his 2 sons' behaviors. Does admit to racing and some time obsessive thoughts with worrying about his youngest grown son's issues. Reports still with his wife and working on their relationship, but she lives in their home and he lives with his mother.     Onset/timeframe - 4 weeks  Type - anxiety, depression  Duration - daily  Aggravating and/or relieving factors/triggers - slightly reduced Cymbalta dose per pharmacy never appeared so patient resigned himself to using what he had at home (50mg) and resultant drop in dose (90mg to 50mg) along 5 days of no medications had left his anxiety spiking and leaving him unable to function.  Treatment and treatment changes (new meds, dosage increases or decreases, med compliance, therapy frequency, etc.) (Past and Recent) - Cymbalta 50mg QD, Klonopin 2mg BID. Reports having tried therapy (psychodynamic therapy) but gave up 'because people were never understanding me, not being in my shoes'.      Issues: Denies SI/HI/AVH currently.           Review of Systems   Constitutional: Negative.    HENT: Negative.     Psychiatric/Behavioral:  Positive for agitation and dysphoric mood. The patient is nervous/anxious.        OARRS:  Adarsh Robles, APRN-CNP on 7/27/2024  6:32 PM  I have personally reviewed the OARRS report for Esvin Gabriel. I have considered the risks of abuse, dependence, addiction and diversion    Is the patient prescribed a combination of a benzodiazepine and opioid?  No    Last Urine Drug Screen / ordered today: Yes  Recent Results (from the past 8760 hour(s))   Confirmation Opiate/Opioid/Benzo Prescription Compliance    Collection Time: 05/31/24  2:19 PM   Result Value Ref Range    Clonazepam <25 <25 ng/mL    7-Aminoclonazepam 232 (H) <25 ng/mL     Alprazolam <25 <25 ng/mL    Alpha-Hydroxyalprazolam <25 <25 ng/mL    Midazolam <25 <25 ng/mL    Alpha-Hydroxymidazolam <25 <25 ng/mL    Chlordiazepoxide <25 <25 ng/mL    Diazepam <25 <25 ng/mL    Nordiazepam <25 <25 ng/mL    Temazepam <25 <25 ng/mL    Oxazepam <25 <25 ng/mL    Lorazepam <25 <25 ng/mL    Methadone <25 <25 ng/mL    EDDP <25 <25 ng/mL    6-Acetylmorphine <25 <25 ng/mL    Codeine <50 <50 ng/mL    Hydrocodone <25 <25 ng/mL    Hydromorphone <25 <25 ng/mL    Morphine  <50 <50 ng/mL    Norhydrocodone <25 <25 ng/mL    Noroxycodone <25 <25 ng/mL    Oxycodone <25 <25 ng/mL    Oxymorphone <25 <25 ng/mL    Fentanyl <2.5 <2.5 ng/mL    Norfentanyl <2.5 <2.5 ng/mL    Tramadol <50 <50 ng/mL    O-Desmethyltramadol <50 <50 ng/mL    Zolpidem <25 <25 ng/mL    Zolpidem Metabolite (ZCA) <25 <25 ng/mL   Screen Opiate/Opioid/Benzo Prescription Compliance    Collection Time: 05/31/24  2:19 PM   Result Value Ref Range    Creatinine, Urine Random 188.4 20.0 - 370.0 mg/dL    Amphetamine Screen, Urine Presumptive Negative Presumptive Negative    Barbiturate Screen, Urine Presumptive Negative Presumptive Negative    Cannabinoid Screen, Urine Presumptive Negative Presumptive Negative    Cocaine Metabolite Screen, Urine Presumptive Negative Presumptive Negative    PCP Screen, Urine Presumptive Negative Presumptive Negative   Benzodiazepine Confirmation, Urine    Collection Time: 02/12/24  8:18 AM   Result Value Ref Range    Clonazepam <25 <25 ng/mL    7-Aminoclonazepam 172 (H) <25 ng/mL    Alprazolam <25 <25 ng/mL    Alpha-Hydroxyalprazolam <25 <25 ng/mL    Midazolam <25 <25 ng/mL    Alpha-Hydroxymidazolam <25 <25 ng/mL    Chlordiazepoxide <25 <25 ng/mL    Diazepam <25 <25 ng/mL    Nordiazepam <25 <25 ng/mL    Temazepam <25 <25 ng/mL    Oxazepam <25 <25 ng/mL    Lorazepam <25 <25 ng/mL   Drug Screen, Urine With Reflex to Confirmation    Collection Time: 02/12/24  8:18 AM   Result Value Ref Range    Amphetamine Screen, Urine  Presumptive Negative Presumptive Negative    Barbiturate Screen, Urine Presumptive Negative Presumptive Negative    Benzodiazepines Screen, Urine Presumptive Negative Presumptive Negative    Cannabinoid Screen, Urine Presumptive Negative Presumptive Negative    Cocaine Metabolite Screen, Urine Presumptive Negative Presumptive Negative    Fentanyl Screen, Urine Presumptive Negative Presumptive Negative    Opiate Screen, Urine Presumptive Negative Presumptive Negative    Oxycodone Screen, Urine Presumptive Negative Presumptive Negative    PCP Screen, Urine Presumptive Negative Presumptive Negative     Results are as expected.         Controlled Substance Agreement:  Date of the Last Agreement: 04/19/2024  Reviewed Controlled Substance Agreement including but not limited to the benefits, risks, and alternatives to treatment with a Controlled Substance medication(s).    Benzodiazepines:  What is the patient's goal of therapy? Stable anxiety/panic feeling  Is this being achieved with current treatment? yes    Activities of Daily Living:   Is your overall impression that this patient is benefiting (symptom reduction outweighs side effects) from benzodiazepine therapy? Yes     1. Physical Functioning: Worse  2. Family Relationship: Better  3. Social Relationship: Better  4. Mood: Worse  5. Sleep Patterns: Better  6. Overall Function: Worse    Objective   Mental Status Exam:  General Appearance: Well groomed, appropriate eye contact  Attitude/Behavior: Cooperative  Motor: Fidgeting  Speech: Normal rate, volume, prosody, Other: (comment) (less rapid compared to previous appointments)  Gait/Station: WFL - Within functional limits  Mood: 'just ok'  Affect: Constricted, Flat, Anxious, Congruent with mood and topic of conversation  Thought Process: Linear, goal directed, Perseverative, Other: (comment) (racing)  Thought Associations: No loosening of associations  Thought Content: Other: (comment) (prescription issues caused  return of anxiety symptoms and depression)  Perception: No perceptual abnormalities noted  Sensorium: Alert and oriented to person, place, time and situation  Insight: Fair  Judgement: Fair  Cognition: Cognitively intact to conversational testing with respect to attention, orientation, fund of knowledge, recent and remote memory, and language  Testing: N/A  BETTY-7/PHQ-9 scores reviewed: 18, 11 compared to 13, 10 reflecting a jump in anxiety and a mild increase in depression.    Vitals:  Vitals:    07/26/24 0922   BP: 134/77   Pulse: 90   Resp: 16   Temp: 37.1 °C (98.7 °F)       Current Medications:  Current Outpatient Medications on File Prior to Visit   Medication Sig Dispense Refill    clonazePAM (KlonoPIN) 2 mg tablet Take 1 tablet (2 mg) by mouth 2 times a day. Do not fill before June 4, 2024. 60 tablet 1    multivitamin tablet Take 1 tablet by mouth once daily. Equate from [employer]      [DISCONTINUED] DULoxetine (Cymbalta) 20 mg DR capsule Take 1 capsule (20 mg) by mouth once daily. Do not crush or chew. 60 capsule 0    [DISCONTINUED] DULoxetine (Cymbalta) 30 mg DR capsule Take 1 capsule (30 mg) by mouth once daily.      [DISCONTINUED] DULoxetine (Cymbalta) 60 mg DR capsule Take 1 capsule (60 mg) by mouth once daily. 90 capsule 3     No current facility-administered medications on file prior to visit.       Lab Review:   Telemedicine on 05/31/2024   Component Date Value    Creatinine, Urine Random 05/31/2024 188.4     Amphetamine Screen, Urine 05/31/2024 Presumptive Negative     Barbiturate Screen, Urine 05/31/2024 Presumptive Negative     Cannabinoid Screen, Urine 05/31/2024 Presumptive Negative     Cocaine Metabolite Scree* 05/31/2024 Presumptive Negative     PCP Screen, Urine 05/31/2024 Presumptive Negative     Clonazepam 05/31/2024 <25     7-Aminoclonazepam 05/31/2024 232 (H)     Alprazolam 05/31/2024 <25     Alpha-Hydroxyalprazolam 05/31/2024 <25     Midazolam 05/31/2024 <25     Alpha-Hydroxymidazolam  05/31/2024 <25     Chlordiazepoxide 05/31/2024 <25     Diazepam 05/31/2024 <25     Nordiazepam 05/31/2024 <25     Temazepam 05/31/2024 <25     Oxazepam 05/31/2024 <25     Lorazepam 05/31/2024 <25     Methadone 05/31/2024 <25     EDDP 05/31/2024 <25     6-Acetylmorphine 05/31/2024 <25     Codeine 05/31/2024 <50     Hydrocodone 05/31/2024 <25     Hydromorphone 05/31/2024 <25     Morphine  05/31/2024 <50     Norhydrocodone 05/31/2024 <25     Noroxycodone 05/31/2024 <25     Oxycodone 05/31/2024 <25     Oxymorphone 05/31/2024 <25     Fentanyl 05/31/2024 <2.5     Norfentanyl 05/31/2024 <2.5     Tramadol 05/31/2024 <50     O-Desmethyltramadol 05/31/2024 <50     Zolpidem 05/31/2024 <25     Zolpidem Metabolite (ZCA) 05/31/2024 <25          Time Spent:    Prep time: 1 min.  Direct patient time: 29 min.  Documentation time: 7 min.  Total time: 37 min.    Next Appointment:  Follow up in about 5 weeks (around 8/30/2024).

## 2024-07-29 DIAGNOSIS — F41.0 PANIC ATTACK DUE TO EXCEPTIONAL STRESS: ICD-10-CM

## 2024-07-29 DIAGNOSIS — F43.0 PANIC ATTACK DUE TO EXCEPTIONAL STRESS: ICD-10-CM

## 2024-08-05 ENCOUNTER — TELEPHONE (OUTPATIENT)
Dept: BEHAVIORAL HEALTH | Facility: CLINIC | Age: 59
End: 2024-08-05
Payer: COMMERCIAL

## 2024-08-05 RX ORDER — CLONAZEPAM 2 MG/1
2 TABLET ORAL 2 TIMES DAILY
Qty: 60 TABLET | Refills: 1 | Status: SHIPPED | OUTPATIENT
Start: 2024-08-05 | End: 2024-10-04

## 2024-08-06 ENCOUNTER — TELEPHONE (OUTPATIENT)
Dept: BEHAVIORAL HEALTH | Facility: CLINIC | Age: 59
End: 2024-08-06

## 2024-08-30 ENCOUNTER — APPOINTMENT (OUTPATIENT)
Dept: BEHAVIORAL HEALTH | Facility: CLINIC | Age: 59
End: 2024-08-30
Payer: COMMERCIAL

## 2024-09-06 ENCOUNTER — APPOINTMENT (OUTPATIENT)
Dept: BEHAVIORAL HEALTH | Facility: CLINIC | Age: 59
End: 2024-09-06
Payer: COMMERCIAL

## 2024-09-06 DIAGNOSIS — F43.10 COMPLEX POSTTRAUMATIC STRESS DISORDER: ICD-10-CM

## 2024-09-06 DIAGNOSIS — F41.0 PANIC ATTACK DUE TO EXCEPTIONAL STRESS: Primary | ICD-10-CM

## 2024-09-06 DIAGNOSIS — F43.0 PANIC ATTACK DUE TO EXCEPTIONAL STRESS: Primary | ICD-10-CM

## 2024-09-06 DIAGNOSIS — F42.8 OBSESSIVE THINKING: ICD-10-CM

## 2024-09-06 DIAGNOSIS — F33.0 MILD EPISODE OF RECURRENT MAJOR DEPRESSIVE DISORDER (CMS-HCC): ICD-10-CM

## 2024-09-06 DIAGNOSIS — F41.1 GAD (GENERALIZED ANXIETY DISORDER): ICD-10-CM

## 2024-09-06 PROCEDURE — 99214 OFFICE O/P EST MOD 30 MIN: CPT | Performed by: NURSE PRACTITIONER

## 2024-09-06 ASSESSMENT — ENCOUNTER SYMPTOMS: NERVOUS/ANXIOUS: 1

## 2024-09-06 NOTE — PROGRESS NOTES
"Adult Ambulatory Psychiatry Progress Note      Assessment/Plan     Impression:  Esvin Gabriel is a 59 y.o. male domiciled  (living separately) with 3 adult sons, employed as  who presents for follow up with CC of \"I was still having problems with the medication not getting filled on time after the last appt, and it took awhile for the pharmacy to get them filled. Since the last appt, I didn't have anxiety but actual panic attacks, and I ended up feeling short winded and worried, and something is triggering it. I already have the Klonopin in my system and I don't know why they are happening. I am just doing something, walking around and it happens. I feel like some things are really starting to bother me and build up more and more.\"    Plan: no changes. Just finds a therapist. Patient was cooperative but anxious. Indicated on increased dose of Cymbalta has helped with overall mood and anxiety but was having breakthrough anxiety and now panic attacks. Discussed his triggers and again reviewed why the Klonopin should have been as needed and not twice daily. No medication changes will be made, and re-emphasized that patient needs to follow through and find a therapist, as he was given the name of the agency at the last appt. Emailed again the name of the agency - [facility] to the patient so that he can work on his trauma history, as well as establish healthy boundaries for himself, especially in regards to his middle son. No other changes to treatment plan.     Medication: Cymbalta 60mg every day. Continues taking Klonopin 2mg twice daily for panic disorder.    Reviewed r/b/a, possible side effects of the medication. Client is aware about the benefit outweighs the risk.     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)          Therapy: none    Other: n/a    Patient is reminded that if there is SI to call [phone] and get themselves to the closest ED for evaluation, otherwise contact me for other questions/concerns.     Subjective   HPI:  \"Both the patient, and family and caregivers and guardians as appropriate, were informed of the current need to conduct treatment via telephone. I have confirmed the patient's identity via the following (minimum of three) acceptable identifiers as per  Policy PH-9: , home address, name of insurance plan.\"    Virtual or Telephone Consent    An interactive audio and video telecommunication system which permits real time communications between the patient (at the originating site) and provider (at the distant site) was utilized to provide this telehealth service.   Verbal consent was requested and obtained from Esvin Gabriel on this date, 24 for a telehealth visit.     HPI:     Present Illness - anxiety, panic attacks     Characteristics/Recent psychiatric symptoms (pertinent positives and negatives) - admits an increase of panic attacks - mostly associated with more worrying about his middle son, who has been making more poor decisions about his life, not working and is easily hot tempered and while wants to be a good father to all 3 of his kids, but especially his middle child, he has difficulty with just letting his son know that he is done helping him out and then falls back onto old habits with helping him out anyways, but knows that the little things that start to bother him, are piling up more quickly and end up exploding, leaving him SOB and his brain is racing and obsessing about his issues in his life. Admits to over-thinking about things in his life and feels easily overwhelmed and 'I freak out about things. It has not been a problem in a long time until recently.' Feels the increased Cymbalta is helping with his depression and anxiety, but the panic " attacks - intensity and frequency are happening more often than usual. Struggles with his thoughts being circuitous and has trouble disengaging his thinking. Admits knowing the Klonopin takes a while to take effect, between 45-60 minutes, and takes it before he leaves work and before he goes into work. Reports loves his kids, and his grandkids and likes his work. Today is visiting with his one granddaughter, 5 yo, and he is feeling good with entertaining her as his son and his gf are working. Reports sleep is still stable at 7 hours a night. Energy levels are stable and overall denies mental/physical fatigue. Still notices his biggest source of irritability is his middle son's behaviors and the lack of support by others at his job as 'they are now more freeloaders and they just don't want to work'. Reports noticing some self-irritability with getting older, that he noted when [place] was [place] and he was walking back to his car with his family, he was getting SOB and tired and 'felt like I was a failure, even though I wasn't one but I turned to my younger son who is strong and kind of a brute and said I feel weak that I have to sit down and catch my breath and he can pull the wagon with the kids. I felt angry with myself that I am getting older and I am feeling it.' Reports appetite is stable. Does admit to racing and more frequent obsessive thoughts with worrying about his sons' issues, but more so his middle son's behaviors and his struggling with just letting his worrying about him go as he is now an adult. Reports still with his wife and working on their relationship, but she lives in their home and he lives with his mother.     Onset/timeframe - 4 weeks  Type - panic attacks  Duration - situational (up to 60 minutes)  Aggravating and/or relieving factors/triggers - on stable dose of Cymbalta is helping with anxiety but panic attacks are happening and doesn't understand why.  Treatment  and treatment changes (new meds, dosage increases or decreases, med compliance, therapy frequency, etc.) (Past and Recent) - Cymbalta 60mg QD, Klonopin 2mg BID. Open to referral for therapy - already gave name of outside agency      Issues: Denies SI/HI/AVH currently.           Review of Systems   Psychiatric/Behavioral:  Positive for behavioral problems. The patient is nervous/anxious.    All other systems reviewed and are negative.      OARRS:  Adarsh Robles, APRN-CNP on 9/8/2024  7:31 PM  I have personally reviewed the OARRS report for Esvin Gabriel. I have considered the risks of abuse, dependence, addiction and diversion    Is the patient prescribed a combination of a benzodiazepine and opioid?  No    Last Urine Drug Screen / ordered today: Yes  Recent Results (from the past 8760 hour(s))   Confirmation Opiate/Opioid/Benzo Prescription Compliance    Collection Time: 05/31/24  2:19 PM   Result Value Ref Range    Clonazepam <25 <25 ng/mL    7-Aminoclonazepam 232 (H) <25 ng/mL    Alprazolam <25 <25 ng/mL    Alpha-Hydroxyalprazolam <25 <25 ng/mL    Midazolam <25 <25 ng/mL    Alpha-Hydroxymidazolam <25 <25 ng/mL    Chlordiazepoxide <25 <25 ng/mL    Diazepam <25 <25 ng/mL    Nordiazepam <25 <25 ng/mL    Temazepam <25 <25 ng/mL    Oxazepam <25 <25 ng/mL    Lorazepam <25 <25 ng/mL    Methadone <25 <25 ng/mL    EDDP <25 <25 ng/mL    6-Acetylmorphine <25 <25 ng/mL    Codeine <50 <50 ng/mL    Hydrocodone <25 <25 ng/mL    Hydromorphone <25 <25 ng/mL    Morphine  <50 <50 ng/mL    Norhydrocodone <25 <25 ng/mL    Noroxycodone <25 <25 ng/mL    Oxycodone <25 <25 ng/mL    Oxymorphone <25 <25 ng/mL    Fentanyl <2.5 <2.5 ng/mL    Norfentanyl <2.5 <2.5 ng/mL    Tramadol <50 <50 ng/mL    O-Desmethyltramadol <50 <50 ng/mL    Zolpidem <25 <25 ng/mL    Zolpidem Metabolite (ZCA) <25 <25 ng/mL   Screen Opiate/Opioid/Benzo Prescription Compliance    Collection Time: 05/31/24  2:19 PM   Result Value Ref Range    Creatinine, Urine Random  188.4 20.0 - 370.0 mg/dL    Amphetamine Screen, Urine Presumptive Negative Presumptive Negative    Barbiturate Screen, Urine Presumptive Negative Presumptive Negative    Cannabinoid Screen, Urine Presumptive Negative Presumptive Negative    Cocaine Metabolite Screen, Urine Presumptive Negative Presumptive Negative    PCP Screen, Urine Presumptive Negative Presumptive Negative   Benzodiazepine Confirmation, Urine    Collection Time: 02/12/24  8:18 AM   Result Value Ref Range    Clonazepam <25 <25 ng/mL    7-Aminoclonazepam 172 (H) <25 ng/mL    Alprazolam <25 <25 ng/mL    Alpha-Hydroxyalprazolam <25 <25 ng/mL    Midazolam <25 <25 ng/mL    Alpha-Hydroxymidazolam <25 <25 ng/mL    Chlordiazepoxide <25 <25 ng/mL    Diazepam <25 <25 ng/mL    Nordiazepam <25 <25 ng/mL    Temazepam <25 <25 ng/mL    Oxazepam <25 <25 ng/mL    Lorazepam <25 <25 ng/mL   Drug Screen, Urine With Reflex to Confirmation    Collection Time: 02/12/24  8:18 AM   Result Value Ref Range    Amphetamine Screen, Urine Presumptive Negative Presumptive Negative    Barbiturate Screen, Urine Presumptive Negative Presumptive Negative    Benzodiazepines Screen, Urine Presumptive Negative Presumptive Negative    Cannabinoid Screen, Urine Presumptive Negative Presumptive Negative    Cocaine Metabolite Screen, Urine Presumptive Negative Presumptive Negative    Fentanyl Screen, Urine Presumptive Negative Presumptive Negative    Opiate Screen, Urine Presumptive Negative Presumptive Negative    Oxycodone Screen, Urine Presumptive Negative Presumptive Negative    PCP Screen, Urine Presumptive Negative Presumptive Negative     Results are as expected.         Controlled Substance Agreement:  Date of the Last Agreement: 04/19/2024  Reviewed Controlled Substance Agreement including but not limited to the benefits, risks, and alternatives to treatment with a Controlled Substance medication(s).    Benzodiazepines:  What is the patient's goal of therapy? Stable anxiety/panic  feeling  Is this being achieved with current treatment? yes    Activities of Daily Living:   Is your overall impression that this patient is benefiting (symptom reduction outweighs side effects) from benzodiazepine therapy? Yes     1. Physical Functioning: Worse  2. Family Relationship: Better  3. Social Relationship: Better  4. Mood: Worse  5. Sleep Patterns: Better  6. Overall Function: Worse    Objective   Mental Status Exam:  General Appearance: Well groomed, appropriate eye contact  Attitude/Behavior: Cooperative, Distracted  Motor: Fidgeting  Speech: Rapid Speech, pressured, Other: (comment) (perseverative requiring frequent interruption and redirection)  Gait/Station: Other:(comment) (sitting in room, over virtual connection)  Mood: 'anxious lately, more than usual'  Affect: Anxious, Increased intensity, Congruent with mood and topic of conversation  Thought Process: Perseverative, Flight of ideas, Thought blocking (racing, obsessive)  Thought Associations: Frequent derailment  Thought Content: Other: (comment), Tangential (can't understand why his anxiety is so strong to the point of panicked more)  Perception: No perceptual abnormalities noted  Sensorium: Alert and oriented to person, place, time and situation  Insight: Limited  Judgement: Fair  Cognition: Cognitively intact to conversational testing with respect to attention, orientation, fund of knowledge, recent and remote memory, and language  Testing: N/A  BETTY-7/PHQ-9 scores reviewed: 18, 11 compared to 13, 10 reflecting a jump in anxiety and a mild increase in depression.    Vitals:  There were no vitals filed for this visit.      Current Medications:  Current Outpatient Medications on File Prior to Visit   Medication Sig Dispense Refill    clonazePAM (KlonoPIN) 2 mg tablet Take 1 tablet (2 mg) by mouth 2 times a day. 60 tablet 1    DULoxetine (Cymbalta) 30 mg DR capsule Take 2 capsules (60 mg) by mouth once daily. 180 capsule 3    multivitamin tablet  Take 1 tablet by mouth once daily. [facility]ate from [facility]       No current facility-administered medications on file prior to visit.       Lab Review:   No visits with results within 2 Month(s) from this visit.   Latest known visit with results is:   Telemedicine on 05/31/2024   Component Date Value    Creatinine, Urine Random 05/31/2024 188.4     Amphetamine Screen, Urine 05/31/2024 Presumptive Negative     Barbiturate Screen, Urine 05/31/2024 Presumptive Negative     Cannabinoid Screen, Urine 05/31/2024 Presumptive Negative     Cocaine Metabolite Scree* 05/31/2024 Presumptive Negative     PCP Screen, Urine 05/31/2024 Presumptive Negative     Clonazepam 05/31/2024 <25     7-Aminoclonazepam 05/31/2024 232 (H)     Alprazolam 05/31/2024 <25     Alpha-Hydroxyalprazolam 05/31/2024 <25     Midazolam 05/31/2024 <25     Alpha-Hydroxymidazolam 05/31/2024 <25     Chlordiazepoxide 05/31/2024 <25     Diazepam 05/31/2024 <25     Nordiazepam 05/31/2024 <25     Temazepam 05/31/2024 <25     Oxazepam 05/31/2024 <25     Lorazepam 05/31/2024 <25     Methadone 05/31/2024 <25     EDDP 05/31/2024 <25     6-Acetylmorphine 05/31/2024 <25     Codeine 05/31/2024 <50     Hydrocodone 05/31/2024 <25     Hydromorphone 05/31/2024 <25     Morphine  05/31/2024 <50     Norhydrocodone 05/31/2024 <25     Noroxycodone 05/31/2024 <25     Oxycodone 05/31/2024 <25     Oxymorphone 05/31/2024 <25     Fentanyl 05/31/2024 <2.5     Norfentanyl 05/31/2024 <2.5     Tramadol 05/31/2024 <50     O-Desmethyltramadol 05/31/2024 <50     Zolpidem 05/31/2024 <25     Zolpidem Metabolite (ZCA) 05/31/2024 <25          Time Spent:    Prep time: 1 min.  Direct patient time: 31 min.  Documentation time: 7 min.  Total time: 39 min.    Next Appointment:  Follow up in about 1 month (around 10/9/2024).

## 2024-10-07 ENCOUNTER — TELEPHONE (OUTPATIENT)
Dept: BEHAVIORAL HEALTH | Facility: CLINIC | Age: 59
End: 2024-10-07
Payer: COMMERCIAL

## 2024-10-07 DIAGNOSIS — F43.0 PANIC ATTACK DUE TO EXCEPTIONAL STRESS: Primary | ICD-10-CM

## 2024-10-07 DIAGNOSIS — F43.0 PANIC ATTACK DUE TO EXCEPTIONAL STRESS: ICD-10-CM

## 2024-10-07 DIAGNOSIS — F41.0 PANIC ATTACK DUE TO EXCEPTIONAL STRESS: ICD-10-CM

## 2024-10-07 DIAGNOSIS — F41.0 PANIC ATTACK DUE TO EXCEPTIONAL STRESS: Primary | ICD-10-CM

## 2024-10-07 RX ORDER — CLONAZEPAM 2 MG/1
2 TABLET ORAL 2 TIMES DAILY
Qty: 60 TABLET | Refills: 1 | Status: SHIPPED | OUTPATIENT
Start: 2024-10-07 | End: 2024-12-06

## 2024-10-07 RX ORDER — CLONAZEPAM 2 MG/1
2 TABLET ORAL 2 TIMES DAILY
Qty: 60 TABLET | Refills: 1 | Status: CANCELLED | OUTPATIENT
Start: 2024-10-07 | End: 2024-12-06

## 2024-10-09 ENCOUNTER — APPOINTMENT (OUTPATIENT)
Dept: BEHAVIORAL HEALTH | Facility: CLINIC | Age: 59
End: 2024-10-09
Payer: COMMERCIAL

## 2024-10-10 ENCOUNTER — APPOINTMENT (OUTPATIENT)
Dept: BEHAVIORAL HEALTH | Facility: CLINIC | Age: 59
End: 2024-10-10
Payer: COMMERCIAL

## 2024-10-21 ENCOUNTER — APPOINTMENT (OUTPATIENT)
Dept: BEHAVIORAL HEALTH | Facility: CLINIC | Age: 59
End: 2024-10-21
Payer: COMMERCIAL

## 2024-10-21 DIAGNOSIS — F42.8 OBSESSIVE THINKING: ICD-10-CM

## 2024-10-21 DIAGNOSIS — F33.0 MILD EPISODE OF RECURRENT MAJOR DEPRESSIVE DISORDER (CMS-HCC): ICD-10-CM

## 2024-10-21 DIAGNOSIS — F41.0 PANIC ATTACK DUE TO EXCEPTIONAL STRESS: ICD-10-CM

## 2024-10-21 DIAGNOSIS — F43.10 COMPLEX POSTTRAUMATIC STRESS DISORDER: ICD-10-CM

## 2024-10-21 DIAGNOSIS — F41.1 GAD (GENERALIZED ANXIETY DISORDER): ICD-10-CM

## 2024-10-21 DIAGNOSIS — F43.0 PANIC ATTACK DUE TO EXCEPTIONAL STRESS: ICD-10-CM

## 2024-10-21 PROCEDURE — 99214 OFFICE O/P EST MOD 30 MIN: CPT | Performed by: NURSE PRACTITIONER

## 2024-10-21 ASSESSMENT — ANXIETY QUESTIONNAIRES
2. NOT BEING ABLE TO STOP OR CONTROL WORRYING: NEARLY EVERY DAY
1. FEELING NERVOUS, ANXIOUS, OR ON EDGE: MORE THAN HALF THE DAYS
3. WORRYING TOO MUCH ABOUT DIFFERENT THINGS: MORE THAN HALF THE DAYS
6. BECOMING EASILY ANNOYED OR IRRITABLE: SEVERAL DAYS
7. FEELING AFRAID AS IF SOMETHING AWFUL MIGHT HAPPEN: SEVERAL DAYS
IF YOU CHECKED OFF ANY PROBLEMS ON THIS QUESTIONNAIRE, HOW DIFFICULT HAVE THESE PROBLEMS MADE IT FOR YOU TO DO YOUR WORK, TAKE CARE OF THINGS AT HOME, OR GET ALONG WITH OTHER PEOPLE: SOMEWHAT DIFFICULT
5. BEING SO RESTLESS THAT IT IS HARD TO SIT STILL: MORE THAN HALF THE DAYS
GAD7 TOTAL SCORE: 12
4. TROUBLE RELAXING: SEVERAL DAYS

## 2024-10-21 ASSESSMENT — PATIENT HEALTH QUESTIONNAIRE - PHQ9
9. THOUGHTS THAT YOU WOULD BE BETTER OFF DEAD, OR OF HURTING YOURSELF: NOT AT ALL
2. FEELING DOWN, DEPRESSED OR HOPELESS: SEVERAL DAYS
1. LITTLE INTEREST OR PLEASURE IN DOING THINGS: SEVERAL DAYS
3. TROUBLE FALLING OR STAYING ASLEEP OR SLEEPING TOO MUCH: SEVERAL DAYS
4. FEELING TIRED OR HAVING LITTLE ENERGY: SEVERAL DAYS
10. IF YOU CHECKED OFF ANY PROBLEMS, HOW DIFFICULT HAVE THESE PROBLEMS MADE IT FOR YOU TO DO YOUR WORK, TAKE CARE OF THINGS AT HOME, OR GET ALONG WITH OTHER PEOPLE: SOMEWHAT DIFFICULT
5. POOR APPETITE OR OVEREATING: SEVERAL DAYS
6. FEELING BAD ABOUT YOURSELF - OR THAT YOU ARE A FAILURE OR HAVE LET YOURSELF OR YOUR FAMILY DOWN: SEVERAL DAYS
8. MOVING OR SPEAKING SO SLOWLY THAT OTHER PEOPLE COULD HAVE NOTICED. OR THE OPPOSITE, BEING SO FIGETY OR RESTLESS THAT YOU HAVE BEEN MOVING AROUND A LOT MORE THAN USUAL: NEARLY EVERY DAY
7. TROUBLE CONCENTRATING ON THINGS, SUCH AS READING THE NEWSPAPER OR WATCHING TELEVISION: NEARLY EVERY DAY

## 2024-10-21 ASSESSMENT — ENCOUNTER SYMPTOMS: NERVOUS/ANXIOUS: 1

## 2024-10-21 NOTE — PROGRESS NOTES
"Adult Ambulatory Psychiatry Progress Note      Assessment/Plan     Impression:  Esvin Gabriel is a 59 y.o. male domiciled  (living separately) with 3 adult sons, employed as  who presents for follow up with CC of \"I have been really stressed out lately. My middle kid has been giving me issues lately and work as well. I had a panic attack a couple of days ago, as the middle one is just driving me crazy. I feel my medication is doing well, but when he calls me, I feel like I get overwhelmed. It's whether his car is not working, or something is not happening. I have not seen his daughter, my granddaughter in over a week. It's like his problems are now my problems, and I don't need this to happen to me nor make it my problem. Otherwise I am good.\"    Plan: Patient was cooperative but anxious. Feels medications are helping him and admits he is working on being a loving parent to his middle, adult son, but also starting to set boundaries with him and not help him so much because he is finding himself to be more anxious and triggered whenever his son calls him. Reviewed and agreed to leaving medications and treatment plan in place and patient indicated he would reach out to [facility] to establish care for himself with a therapist of his own.    Medication: Cymbalta 60mg every day. Continues taking Klonopin 2mg twice daily for panic disorder.    Reviewed r/b/a, possible side effects of the medication. Client is aware about the benefit outweighs the risk.     Diagnoses and all orders for this visit:  BETTY (generalized anxiety disorder)  Obsessive thinking  Panic attack due to exceptional stress  Complex posttraumatic stress disorder  Mild episode of recurrent major depressive disorder (CMS-HCC)            Therapy: none    Other: n/a    Patient is reminded that if there is SI to call [phone] and get themselves to the closest ED for evaluation, otherwise contact me for other " "questions/concerns.     Subjective   HPI:  \"Both the patient, and family and caregivers and guardians as appropriate, were informed of the current need to conduct treatment via telephone. I have confirmed the patient's identity via the following (minimum of three) acceptable identifiers as per  Policy PH-9: , home address, name of insurance plan.\"    Virtual or Telephone Consent    An interactive audio and video telecommunication system which permits real time communications between the patient (at the originating site) and provider (at the distant site) was utilized to provide this telehealth service.   Verbal consent was requested and obtained from Esvin Gabriel on this date, 10/21/24 for a telehealth visit.     HPI:     Present Illness - anxiety, panic attacks     Characteristics/Recent psychiatric symptoms (pertinent positives and negatives) - admits an increase of panic attacks - mostly associated with having to deal with his middle son, who is dependent on the patient to fix his problems and it is stressing him out with his issues. Reports his son has resumed drinking again, and the patient is putting in place boundaries/limits with him including telling him that he needs to get help (therapy, AA) and had told him that he is willing to help him go to therapy but 'I told him that I need to focus more on my health and take care of me and not continue to do things for him.' Admits his son's issues (repairs to car, give him money for example) can overwhelm him, and feels his anxiety spikes, almost anticipatory anxiety, when his son calls and sees his name pop up on the phone. \"I am his father and I can't not turn him away because I am a parent, but I do know that I need to take care of me more.' Reports lately work has been putting through mandatory overtime, due to the position that he works in, for the past several months, but starting today he is going back down to 8 hour shifts and feels that will " "alleviate that work stress (additional 10 hours a week was too much for him to manage). \"I am happy about that happening.\" Reports making effort to walk around the neighborhood, especially with his one granddaughter, enjoying the fall weather/changes in the leaves (yesterday had a good, relaxing time with her) and spending quality time with her whenever he can. Admits to over-thinking about things in his life but admits making effort to not let himself get as overwhelmed as he is focusing more on his own self-care. Current dose of Cymbalta is managing his overall depression and anxiety. Struggles with his thoughts at times being circuitous still but is being more conscious of that happening and working on distracting and disengaging his thinking. Admits knowing the Klonopin takes a while to take effect, between 45-60 minutes, and takes it before he leaves work and before he goes into work. Reports he spends also quality time with his wife, his youngest son and granddaughters. Reports sleep is still stable at 7 hours a night (works 3rd shift so sleeps through the day). Energy levels are stable and overall denies mental/physical fatigue. Reports appetite is 'good but I could stand to lose some pounds. I haven't been this heavy in a long time. I am drinking a lot of pop especially when I stress out.' Does admit to racing and more frequent obsessive thoughts with worrying about his sons' issues, but more so his middle son's behaviors and making conscious effort to let them go. Admits to some seasonal depression, once it starts to get colder, more unmotivated and grey/raining out 'and I feel that is normal for me.'     Onset/timeframe - 4 weeks  Type - panic attacks  Duration - situational (up to 60 minutes)  Aggravating and/or relieving factors/triggers - on stable dose of Cymbalta is helping with anxiety but panic attacks are happening and doesn't understand why.  Treatment and treatment changes (new meds, dosage " increases or decreases, med compliance, therapy frequency, etc.) (Past and Recent) - Cymbalta 60mg QD, Klonopin 2mg BID. Open to referral for therapy - already gave name of outside agency      Issues: Denies SI/HI/AVH currently.           Review of Systems   Psychiatric/Behavioral:  Positive for behavioral problems. The patient is nervous/anxious.    All other systems reviewed and are negative.      OARRS:  Adarsh Robles, APRN-CNP on 10/21/2024  7:06 PM  I have personally reviewed the OARRS report for Esivn Garbiel. I have considered the risks of abuse, dependence, addiction and diversion    Is the patient prescribed a combination of a benzodiazepine and opioid?  No    Last Urine Drug Screen / ordered today: Yes  Recent Results (from the past 8760 hours)   Confirmation Opiate/Opioid/Benzo Prescription Compliance    Collection Time: 05/31/24  2:19 PM   Result Value Ref Range    Clonazepam <25 <25 ng/mL    7-Aminoclonazepam 232 (H) <25 ng/mL    Alprazolam <25 <25 ng/mL    Alpha-Hydroxyalprazolam <25 <25 ng/mL    Midazolam <25 <25 ng/mL    Alpha-Hydroxymidazolam <25 <25 ng/mL    Chlordiazepoxide <25 <25 ng/mL    Diazepam <25 <25 ng/mL    Nordiazepam <25 <25 ng/mL    Temazepam <25 <25 ng/mL    Oxazepam <25 <25 ng/mL    Lorazepam <25 <25 ng/mL    Methadone <25 <25 ng/mL    EDDP <25 <25 ng/mL    6-Acetylmorphine <25 <25 ng/mL    Codeine <50 <50 ng/mL    Hydrocodone <25 <25 ng/mL    Hydromorphone <25 <25 ng/mL    Morphine  <50 <50 ng/mL    Norhydrocodone <25 <25 ng/mL    Noroxycodone <25 <25 ng/mL    Oxycodone <25 <25 ng/mL    Oxymorphone <25 <25 ng/mL    Fentanyl <2.5 <2.5 ng/mL    Norfentanyl <2.5 <2.5 ng/mL    Tramadol <50 <50 ng/mL    O-Desmethyltramadol <50 <50 ng/mL    Zolpidem <25 <25 ng/mL    Zolpidem Metabolite (ZCA) <25 <25 ng/mL   Screen Opiate/Opioid/Benzo Prescription Compliance    Collection Time: 05/31/24  2:19 PM   Result Value Ref Range    Creatinine, Urine Random 188.4 20.0 - 370.0 mg/dL    Amphetamine  Screen, Urine Presumptive Negative Presumptive Negative    Barbiturate Screen, Urine Presumptive Negative Presumptive Negative    Cannabinoid Screen, Urine Presumptive Negative Presumptive Negative    Cocaine Metabolite Screen, Urine Presumptive Negative Presumptive Negative    PCP Screen, Urine Presumptive Negative Presumptive Negative   Benzodiazepine Confirmation, Urine    Collection Time: 02/12/24  8:18 AM   Result Value Ref Range    Clonazepam <25 <25 ng/mL    7-Aminoclonazepam 172 (H) <25 ng/mL    Alprazolam <25 <25 ng/mL    Alpha-Hydroxyalprazolam <25 <25 ng/mL    Midazolam <25 <25 ng/mL    Alpha-Hydroxymidazolam <25 <25 ng/mL    Chlordiazepoxide <25 <25 ng/mL    Diazepam <25 <25 ng/mL    Nordiazepam <25 <25 ng/mL    Temazepam <25 <25 ng/mL    Oxazepam <25 <25 ng/mL    Lorazepam <25 <25 ng/mL   Drug Screen, Urine With Reflex to Confirmation    Collection Time: 02/12/24  8:18 AM   Result Value Ref Range    Amphetamine Screen, Urine Presumptive Negative Presumptive Negative    Barbiturate Screen, Urine Presumptive Negative Presumptive Negative    Benzodiazepines Screen, Urine Presumptive Negative Presumptive Negative    Cannabinoid Screen, Urine Presumptive Negative Presumptive Negative    Cocaine Metabolite Screen, Urine Presumptive Negative Presumptive Negative    Fentanyl Screen, Urine Presumptive Negative Presumptive Negative    Opiate Screen, Urine Presumptive Negative Presumptive Negative    Oxycodone Screen, Urine Presumptive Negative Presumptive Negative    PCP Screen, Urine Presumptive Negative Presumptive Negative     Results are as expected.         Controlled Substance Agreement:  Date of the Last Agreement: 04/19/2024  Reviewed Controlled Substance Agreement including but not limited to the benefits, risks, and alternatives to treatment with a Controlled Substance medication(s).    Benzodiazepines:  What is the patient's goal of therapy? Stable anxiety/panic feeling  Is this being achieved with  current treatment? yes    Activities of Daily Living:   Is your overall impression that this patient is benefiting (symptom reduction outweighs side effects) from benzodiazepine therapy? Yes     1. Physical Functioning: Better  2. Family Relationship: Same  3. Social Relationship: Better  4. Mood: Better  5. Sleep Patterns: Better  6. Overall Function: Better    Objective   Mental Status Exam:  General Appearance: Well groomed, appropriate eye contact  Attitude/Behavior: Cooperative  Motor: No psychomotor agitation or retardation, no tremor or other abnormal movements  Speech: Normal rate, volume, prosody, Other: (comment) (pronounced stutter)  Gait/Station: Other:(comment) (sitting in work room, at home, over virtual connection)  Affect: Dysphoric, constricted but reactive, Anxious, Congruent with mood and topic of conversation  Thought Process: Linear, goal directed (obsessive)  Thought Associations: No loosening of associations  Thought Content: Normal, Other: (comment) (wants to be a loving father to his grown kids, especially his middle son, yet also start to establish firm boundaries with his middle son and tell him that he needs to figure out his own problems on his own and get help with his ETOH use)  Perception: No perceptual abnormalities noted  Sensorium: Alert and oriented to person, place, time and situation  Insight: Intact  Judgement: Intact  Cognition: Cognitively intact to conversational testing with respect to attention, orientation, fund of knowledge, recent and remote memory, and language  Testing: N/A    BETTY-7/PHQ-9 scores reviewed: 12, 12 compared to 18, 11 reflecting an improvement in anxiety and a mild increase in depression.    Vitals:  There were no vitals filed for this visit.      Current Medications:  Current Outpatient Medications on File Prior to Visit   Medication Sig Dispense Refill    clonazePAM (KlonoPIN) 2 mg tablet Take 1 tablet (2 mg) by mouth 2 times a day. 60 tablet 1     DULoxetine (Cymbalta) 30 mg DR capsule Take 2 capsules (60 mg) by mouth once daily. 180 capsule 3    multivitamin tablet Take 1 tablet by mouth once daily. [facility] from [facility]       No current facility-administered medications on file prior to visit.       Lab Review:   No visits with results within 2 Month(s) from this visit.   Latest known visit with results is:   Telemedicine on 05/31/2024   Component Date Value    Creatinine, Urine Random 05/31/2024 188.4     Amphetamine Screen, Urine 05/31/2024 Presumptive Negative     Barbiturate Screen, Urine 05/31/2024 Presumptive Negative     Cannabinoid Screen, Urine 05/31/2024 Presumptive Negative     Cocaine Metabolite Scree* 05/31/2024 Presumptive Negative     PCP Screen, Urine 05/31/2024 Presumptive Negative     Clonazepam 05/31/2024 <25     7-Aminoclonazepam 05/31/2024 232 (H)     Alprazolam 05/31/2024 <25     Alpha-Hydroxyalprazolam 05/31/2024 <25     Midazolam 05/31/2024 <25     Alpha-Hydroxymidazolam 05/31/2024 <25     Chlordiazepoxide 05/31/2024 <25     Diazepam 05/31/2024 <25     Nordiazepam 05/31/2024 <25     Temazepam 05/31/2024 <25     Oxazepam 05/31/2024 <25     Lorazepam 05/31/2024 <25     Methadone 05/31/2024 <25     EDDP 05/31/2024 <25     6-Acetylmorphine 05/31/2024 <25     Codeine 05/31/2024 <50     Hydrocodone 05/31/2024 <25     Hydromorphone 05/31/2024 <25     Morphine  05/31/2024 <50     Norhydrocodone 05/31/2024 <25     Noroxycodone 05/31/2024 <25     Oxycodone 05/31/2024 <25     Oxymorphone 05/31/2024 <25     Fentanyl 05/31/2024 <2.5     Norfentanyl 05/31/2024 <2.5     Tramadol 05/31/2024 <50     O-Desmethyltramadol 05/31/2024 <50     Zolpidem 05/31/2024 <25     Zolpidem Metabolite (ZCA) 05/31/2024 <25          Time Spent:    Prep time: 1 min.  Direct patient time: 26 min.  Documentation time: 6 min.  Total time: 33 min.    Next Appointment:  Follow up in 8 weeks (on 12/13/2024).

## 2024-12-09 ENCOUNTER — TELEPHONE (OUTPATIENT)
Dept: OTHER | Age: 59
End: 2024-12-09
Payer: COMMERCIAL

## 2024-12-09 DIAGNOSIS — F41.0 PANIC ATTACK DUE TO EXCEPTIONAL STRESS: Primary | ICD-10-CM

## 2024-12-09 DIAGNOSIS — F43.0 PANIC ATTACK DUE TO EXCEPTIONAL STRESS: Primary | ICD-10-CM

## 2024-12-09 RX ORDER — CLONAZEPAM 2 MG/1
2 TABLET ORAL 2 TIMES DAILY
Qty: 60 TABLET | Refills: 1 | Status: SHIPPED | OUTPATIENT
Start: 2024-12-09 | End: 2025-02-07

## 2024-12-09 NOTE — TELEPHONE ENCOUNTER
HI, can we refill Rodneys Klonopin? He's been out of it since Saturday, thank you, I told him I'd let him know that it was done, since he's asking for a call back

## 2024-12-09 NOTE — PROGRESS NOTES
Reviewed OARRS on 12/09/2024 by TANJA Aguirre -OARRS has been reviewed and is consistent with prescribed medications, Considered the risks of abuse, dependence, addiction and diversion, Medication is felt to be clinically appropriate based on documented diagnosis

## 2024-12-13 ENCOUNTER — APPOINTMENT (OUTPATIENT)
Dept: BEHAVIORAL HEALTH | Facility: CLINIC | Age: 59
End: 2024-12-13
Payer: COMMERCIAL

## 2024-12-30 ENCOUNTER — APPOINTMENT (OUTPATIENT)
Dept: BEHAVIORAL HEALTH | Facility: CLINIC | Age: 59
End: 2024-12-30
Payer: COMMERCIAL

## 2024-12-30 DIAGNOSIS — F33.0 MILD EPISODE OF RECURRENT MAJOR DEPRESSIVE DISORDER (CMS-HCC): ICD-10-CM

## 2024-12-30 DIAGNOSIS — F41.1 GAD (GENERALIZED ANXIETY DISORDER): ICD-10-CM

## 2024-12-30 DIAGNOSIS — F42.8 OBSESSIVE THINKING: ICD-10-CM

## 2024-12-30 DIAGNOSIS — F43.10 COMPLEX POSTTRAUMATIC STRESS DISORDER: ICD-10-CM

## 2024-12-30 DIAGNOSIS — F41.0 PANIC ATTACK DUE TO EXCEPTIONAL STRESS: ICD-10-CM

## 2024-12-30 DIAGNOSIS — F43.0 PANIC ATTACK DUE TO EXCEPTIONAL STRESS: ICD-10-CM

## 2024-12-30 PROCEDURE — 99214 OFFICE O/P EST MOD 30 MIN: CPT | Performed by: NURSE PRACTITIONER

## 2024-12-30 RX ORDER — DIPHENHYDRAMINE HCL 25 MG
15 CAPSULE ORAL NIGHTLY PRN
COMMUNITY

## 2024-12-30 ASSESSMENT — ANXIETY QUESTIONNAIRES
7. FEELING AFRAID AS IF SOMETHING AWFUL MIGHT HAPPEN: SEVERAL DAYS
5. BEING SO RESTLESS THAT IT IS HARD TO SIT STILL: MORE THAN HALF THE DAYS
IF YOU CHECKED OFF ANY PROBLEMS ON THIS QUESTIONNAIRE, HOW DIFFICULT HAVE THESE PROBLEMS MADE IT FOR YOU TO DO YOUR WORK, TAKE CARE OF THINGS AT HOME, OR GET ALONG WITH OTHER PEOPLE: SOMEWHAT DIFFICULT
1. FEELING NERVOUS, ANXIOUS, OR ON EDGE: NEARLY EVERY DAY
3. WORRYING TOO MUCH ABOUT DIFFERENT THINGS: NEARLY EVERY DAY
GAD7 TOTAL SCORE: 15
6. BECOMING EASILY ANNOYED OR IRRITABLE: SEVERAL DAYS
4. TROUBLE RELAXING: MORE THAN HALF THE DAYS
2. NOT BEING ABLE TO STOP OR CONTROL WORRYING: NEARLY EVERY DAY

## 2024-12-30 ASSESSMENT — ENCOUNTER SYMPTOMS: NERVOUS/ANXIOUS: 1

## 2024-12-30 ASSESSMENT — PATIENT HEALTH QUESTIONNAIRE - PHQ9
9. THOUGHTS THAT YOU WOULD BE BETTER OFF DEAD, OR OF HURTING YOURSELF: NOT AT ALL
6. FEELING BAD ABOUT YOURSELF - OR THAT YOU ARE A FAILURE OR HAVE LET YOURSELF OR YOUR FAMILY DOWN: NOT AT ALL
4. FEELING TIRED OR HAVING LITTLE ENERGY: MORE THAN HALF THE DAYS
7. TROUBLE CONCENTRATING ON THINGS, SUCH AS READING THE NEWSPAPER OR WATCHING TELEVISION: NOT AT ALL
3. TROUBLE FALLING OR STAYING ASLEEP OR SLEEPING TOO MUCH: SEVERAL DAYS
5. POOR APPETITE OR OVEREATING: NOT AT ALL
8. MOVING OR SPEAKING SO SLOWLY THAT OTHER PEOPLE COULD HAVE NOTICED. OR THE OPPOSITE, BEING SO FIGETY OR RESTLESS THAT YOU HAVE BEEN MOVING AROUND A LOT MORE THAN USUAL: NOT AT ALL
2. FEELING DOWN, DEPRESSED OR HOPELESS: NOT AT ALL
1. LITTLE INTEREST OR PLEASURE IN DOING THINGS: MORE THAN HALF THE DAYS
10. IF YOU CHECKED OFF ANY PROBLEMS, HOW DIFFICULT HAVE THESE PROBLEMS MADE IT FOR YOU TO DO YOUR WORK, TAKE CARE OF THINGS AT HOME, OR GET ALONG WITH OTHER PEOPLE: NOT DIFFICULT AT ALL

## 2024-12-30 NOTE — PROGRESS NOTES
"Adult Ambulatory Psychiatry Progress Note      Assessment/Plan     Impression:  Esvin Gabriel is a 59 y.o. male domiciled  (living separately) with 3 adult sons, employed as  who presents for follow up with CC of \"I was supposed to see you in person, but I ended up with COVID. I am better now, but because of shortness of breath, I had several panic attacks and my anxiety went through the roof. I missed my medications 2 days in a row and that was why. I was in such a bad shape then. I am feeling better now.\"    Plan: Patient was cooperative but anxious. Talked about recent spat of panic attacks, tying them to dealing with COVID, and forgetting to take his medications for 2 days straight. Discussed goal of having him come down on dose of Klonopin as originally discussed and eventually long term goal of only having 7 tablets on hand as needed, as he had developed a need to be on the drug for so long, that he thought he was being prescribed the Klonopin like a regular psych medication. Admits he is willing to come off of it, and will reach out to a friend through his [employer] whom is a counselor for therapy. Reviewed and agreed to leaving medications and treatment plan in place.     Medication: Cymbalta 60mg every day. Continues taking Klonopin 2mg twice daily for panic disorder.    Reviewed r/b/a, possible side effects of the medication. Client is aware about the benefit outweighs the risk.     Diagnoses and all orders for this visit:  BETTY (generalized anxiety disorder)  Obsessive thinking  Panic attack due to exceptional stress  Complex posttraumatic stress disorder  Mild episode of recurrent major depressive disorder ([facility])              Therapy: none    Other: n/a    Patient is reminded that if there is SI to call [phone] and get themselves to the closest ED for evaluation, otherwise contact me for other questions/concerns.     Subjective   HPI:    Virtual Consent    An interactive audio and video " telecommunication system which permits real time communications between the patient (at home) and provider (at home office) was utilized to provide this telehealth service.   Verbal consent was requested and obtained from Esvin Gabriel on this date, 12/30/24 for a telehealth visit.     HPI:     Present Illness - anxiety, panic attacks     Characteristics/Recent psychiatric symptoms (pertinent positives and negatives) - reports racing, obsessive thoughts can trigger panic attacks still and worries about cutting back on Klonopin doses as had been agreed at the beginning of appts. Remembers discussion about getting a therapist, and reveals that recently he went on a trip to the mall with his grandkids and founds himself to be calm and had no anxiety or worries and realized it was because he was focused on the fun and time spent with them. Reports finding out a friend of his through the [employer] who offers free therapy and wants to take advantage of that service as he also wants to start weaning himself slowly off of Klonopin. Reports overall anxiety and depression are stable again, as he feels his COVID symptoms are lifting and is ready to go back to work on Wednesday Jam 1. Will be making effort to leave his home today for the first time in 8 days and walk around the neighborhood. Reports his one brother was in the hospital d/t COVID recently. Reports he did not get to enjoy [employer] and so his wife had to shop [employer] and order gifts for everyone and they will celebrate the holidays together with their kids and grandkids later on. Work is going well otherwise. Reports his middle son 'has been off his rocker lately, but had left me alone because I was sick and had bugged his mother. He is working now, which is good however.' Reports his oldest and youngest sons are doing well. Reports his middle son refuses to deal with his alcoholism and the patient reports he has not seen him in over 2 months and has not confronted him as  "he knows he will trigger his anxiety, and makes sure to not answer his calls and is establishing healthy boundaries for himself for his son. \"I love him but I know he is not good for me and I told him that.\" Reports he and his wife told him that they support him to go to AA but he gets confrontational and upset with the patient and avoids bringing it up now. Current dose of Cymbalta still manages his overall depression and anxiety. Struggles with his thoughts at times being circuitous still but is making effort to monitor them with how distracting and disengaging they can be with his thinking. Admits knowing the Klonopin takes a while to take effect, between 45-60 minutes, but knows it is a 'game changer' but wants to change taking how often he uses it and become less dependent on using it. Reports sleep is still stable at 7 hours a night (works 3rd shift so sleeps through the day). Energy levels are stable and overall denies mental/physical fatigue, outside of being sick with COVID over the past 8 days. Reports appetite is 'good.' Does report less frequent racing and obsessive thoughts with worrying about his sons' issues as he continues to maintain healthy boundaries. Admits to some seasonal depression.      Onset/timeframe - 2 weeks  Type - panic attacks, anxiety  Duration - situational   Aggravating and/or relieving factors/triggers - recently sick with COVID, resulting in SOB, had caused some panic but also realized that he forgot to take his meds for 2 days which caused anxiety to spike including more panic attacks, revealing how dependent he had become on the Klonopin. But is putting up healthy boundaries for himself with his middle son whose behaviors are a trigger for him.  Treatment and treatment changes (new meds, dosage increases or decreases, med compliance, therapy frequency, etc.) (Past and Recent) - Cymbalta 60mg QD, Klonopin 2mg BID. Open to referral for therapy - already gave name of outside " agency      Issues: Denies SI/HI/AVH currently.           Review of Systems   Psychiatric/Behavioral:  The patient is nervous/anxious.    All other systems reviewed and are negative.      OARRS:  Adarsh Robles, APRN-CNP on 12/30/2024  1:50 PM  I have personally reviewed the OARRS report for Esvin Gabriel. I have considered the risks of abuse, dependence, addiction and diversion    Is the patient prescribed a combination of a benzodiazepine and opioid?  No    Last Urine Drug Screen / ordered today: Yes  Recent Results (from the past 8760 hours)   Confirmation Opiate/Opioid/Benzo Prescription Compliance    Collection Time: 05/31/24  2:19 PM   Result Value Ref Range    Clonazepam <25 <25 ng/mL    7-Aminoclonazepam 232 (H) <25 ng/mL    Alprazolam <25 <25 ng/mL    Alpha-Hydroxyalprazolam <25 <25 ng/mL    Midazolam <25 <25 ng/mL    Alpha-Hydroxymidazolam <25 <25 ng/mL    Chlordiazepoxide <25 <25 ng/mL    Diazepam <25 <25 ng/mL    Nordiazepam <25 <25 ng/mL    Temazepam <25 <25 ng/mL    Oxazepam <25 <25 ng/mL    Lorazepam <25 <25 ng/mL    Methadone <25 <25 ng/mL    EDDP <25 <25 ng/mL    6-Acetylmorphine <25 <25 ng/mL    Codeine <50 <50 ng/mL    Hydrocodone <25 <25 ng/mL    Hydromorphone <25 <25 ng/mL    Morphine  <50 <50 ng/mL    Norhydrocodone <25 <25 ng/mL    Noroxycodone <25 <25 ng/mL    Oxycodone <25 <25 ng/mL    Oxymorphone <25 <25 ng/mL    Fentanyl <2.5 <2.5 ng/mL    Norfentanyl <2.5 <2.5 ng/mL    Tramadol <50 <50 ng/mL    O-Desmethyltramadol <50 <50 ng/mL    Zolpidem <25 <25 ng/mL    Zolpidem Metabolite (ZCA) <25 <25 ng/mL   Screen Opiate/Opioid/Benzo Prescription Compliance    Collection Time: 05/31/24  2:19 PM   Result Value Ref Range    Creatinine, Urine Random 188.4 20.0 - 370.0 mg/dL    Amphetamine Screen, Urine Presumptive Negative Presumptive Negative    Barbiturate Screen, Urine Presumptive Negative Presumptive Negative    Cannabinoid Screen, Urine Presumptive Negative Presumptive Negative    Cocaine  Metabolite Screen, Urine Presumptive Negative Presumptive Negative    PCP Screen, Urine Presumptive Negative Presumptive Negative   Benzodiazepine Confirmation, Urine    Collection Time: 02/12/24  8:18 AM   Result Value Ref Range    Clonazepam <25 <25 ng/mL    7-Aminoclonazepam 172 (H) <25 ng/mL    Alprazolam <25 <25 ng/mL    Alpha-Hydroxyalprazolam <25 <25 ng/mL    Midazolam <25 <25 ng/mL    Alpha-Hydroxymidazolam <25 <25 ng/mL    Chlordiazepoxide <25 <25 ng/mL    Diazepam <25 <25 ng/mL    Nordiazepam <25 <25 ng/mL    Temazepam <25 <25 ng/mL    Oxazepam <25 <25 ng/mL    Lorazepam <25 <25 ng/mL   Drug Screen, Urine With Reflex to Confirmation    Collection Time: 02/12/24  8:18 AM   Result Value Ref Range    Amphetamine Screen, Urine Presumptive Negative Presumptive Negative    Barbiturate Screen, Urine Presumptive Negative Presumptive Negative    Benzodiazepines Screen, Urine Presumptive Negative Presumptive Negative    Cannabinoid Screen, Urine Presumptive Negative Presumptive Negative    Cocaine Metabolite Screen, Urine Presumptive Negative Presumptive Negative    Fentanyl Screen, Urine Presumptive Negative Presumptive Negative    Opiate Screen, Urine Presumptive Negative Presumptive Negative    Oxycodone Screen, Urine Presumptive Negative Presumptive Negative    PCP Screen, Urine Presumptive Negative Presumptive Negative     Results are as expected.         Controlled Substance Agreement:  Date of the Last Agreement: 04/19/2024  Reviewed Controlled Substance Agreement including but not limited to the benefits, risks, and alternatives to treatment with a Controlled Substance medication(s).    Benzodiazepines:  What is the patient's goal of therapy? Stable anxiety/panic feeling  Is this being achieved with current treatment? yes    Activities of Daily Living:   Is your overall impression that this patient is benefiting (symptom reduction outweighs side effects) from benzodiazepine therapy? Yes     1. Physical  Functioning: Better  2. Family Relationship: Same  3. Social Relationship: Better  4. Mood: Better  5. Sleep Patterns: Better  6. Overall Function: Better    Objective   Mental Status Exam:  General Appearance: Well groomed, appropriate eye contact  Attitude/Behavior: Cooperative  Motor: No psychomotor agitation or retardation, no tremor or other abnormal movements  Speech: Normal rate, volume, prosody, Other: (comment) (noticeable stutter)  Gait/Station: Other:(comment) (sitting in bedroom at home)  Mood: 'getting better'  Affect: Euthymic, full-range, Anxious, Congruent with mood and topic of conversation  Thought Process: Linear, goal directed, Perseverative, Flight of ideas (obsessive)  Thought Associations: No loosening of associations  Thought Content: Other: (comment) (wanting to work on reducing his dependence on Klonopin, only now understanding that Klonopin is a PRN and not daily medication, and is open to talking with a friend through [employer] who is a counselor for therapy to talk about his anxiety issues.)  Perception: No perceptual abnormalities noted  Sensorium: Alert and oriented to person, place, time and situation  Insight: Fair  Judgement: Intact  Cognition: Cognitively intact to conversational testing with respect to attention, orientation, fund of knowledge, recent and remote memory, and language  Testing: N/A    BETTY-7/PHQ-9 scores reviewed: 15, 5 compared to 18, 11 reflecting a small improvement in anxiety and a drop in depression.    Current Medications:  Current Outpatient Medications on File Prior to Visit   Medication Sig Dispense Refill    clonazePAM (KlonoPIN) 2 mg tablet Take 1 tablet (2 mg) by mouth 2 times a day. 60 tablet 1    doxylamine-DM-acetaminophen (Vicks Nyquil Nighttime Relief) 6.25- mg/15 mL liquid Take 15 mL by mouth as needed at bedtime (cold/flu).      DULoxetine (Cymbalta) 30 mg DR capsule Take 2 capsules (60 mg) by mouth once daily. 180 capsule 3    multivitamin  tablet Take 1 tablet by mouth once daily. Equate from [facility]       No current facility-administered medications on file prior to visit.       Lab Review:   No visits with results within 2 Month(s) from this visit.   Latest known visit with results is:   Telemedicine on 05/31/2024   Component Date Value    Creatinine, Urine Random 05/31/2024 188.4     Amphetamine Screen, Urine 05/31/2024 Presumptive Negative     Barbiturate Screen, Urine 05/31/2024 Presumptive Negative     Cannabinoid Screen, Urine 05/31/2024 Presumptive Negative     Cocaine Metabolite Scree* 05/31/2024 Presumptive Negative     PCP Screen, Urine 05/31/2024 Presumptive Negative     Clonazepam 05/31/2024 <25     7-Aminoclonazepam 05/31/2024 232 (H)     Alprazolam 05/31/2024 <25     Alpha-Hydroxyalprazolam 05/31/2024 <25     Midazolam 05/31/2024 <25     Alpha-Hydroxymidazolam 05/31/2024 <25     Chlordiazepoxide 05/31/2024 <25     Diazepam 05/31/2024 <25     Nordiazepam 05/31/2024 <25     Temazepam 05/31/2024 <25     Oxazepam 05/31/2024 <25     Lorazepam 05/31/2024 <25     Methadone 05/31/2024 <25     EDDP 05/31/2024 <25     6-Acetylmorphine 05/31/2024 <25     Codeine 05/31/2024 <50     Hydrocodone 05/31/2024 <25     Hydromorphone 05/31/2024 <25     Morphine  05/31/2024 <50     Norhydrocodone 05/31/2024 <25     Noroxycodone 05/31/2024 <25     Oxycodone 05/31/2024 <25     Oxymorphone 05/31/2024 <25     Fentanyl 05/31/2024 <2.5     Norfentanyl 05/31/2024 <2.5     Tramadol 05/31/2024 <50     O-Desmethyltramadol 05/31/2024 <50     Zolpidem 05/31/2024 <25     Zolpidem Metabolite (ZCA) 05/31/2024 <25          Time Spent:    Prep time: 1 min.  Direct patient time: 30 min.  Documentation time: 6 min.  Total time: 37 min.    Next Appointment:  Follow up in 7 weeks (on 2/14/2025).

## 2025-01-17 ENCOUNTER — APPOINTMENT (OUTPATIENT)
Dept: BEHAVIORAL HEALTH | Facility: CLINIC | Age: 60
End: 2025-01-17
Payer: COMMERCIAL

## 2025-02-10 ENCOUNTER — TELEPHONE (OUTPATIENT)
Dept: BEHAVIORAL HEALTH | Facility: CLINIC | Age: 60
End: 2025-02-10
Payer: COMMERCIAL

## 2025-02-10 DIAGNOSIS — F41.0 PANIC ATTACK DUE TO EXCEPTIONAL STRESS: Primary | ICD-10-CM

## 2025-02-10 DIAGNOSIS — F43.0 PANIC ATTACK DUE TO EXCEPTIONAL STRESS: Primary | ICD-10-CM

## 2025-02-10 RX ORDER — CLONAZEPAM 2 MG/1
2 TABLET ORAL 2 TIMES DAILY
Qty: 60 TABLET | Refills: 1 | Status: SHIPPED | OUTPATIENT
Start: 2025-02-10 | End: 2025-04-11

## 2025-02-14 ENCOUNTER — APPOINTMENT (OUTPATIENT)
Dept: BEHAVIORAL HEALTH | Facility: CLINIC | Age: 60
End: 2025-02-14
Payer: COMMERCIAL

## 2025-02-14 DIAGNOSIS — F41.0 PANIC ATTACK DUE TO EXCEPTIONAL STRESS: ICD-10-CM

## 2025-02-14 DIAGNOSIS — F42.8 OBSESSIVE THINKING: ICD-10-CM

## 2025-02-14 DIAGNOSIS — F43.0 PANIC ATTACK DUE TO EXCEPTIONAL STRESS: ICD-10-CM

## 2025-02-14 DIAGNOSIS — F41.1 GAD (GENERALIZED ANXIETY DISORDER): ICD-10-CM

## 2025-02-14 DIAGNOSIS — F43.10 COMPLEX POSTTRAUMATIC STRESS DISORDER: ICD-10-CM

## 2025-02-14 DIAGNOSIS — F33.0 MILD EPISODE OF RECURRENT MAJOR DEPRESSIVE DISORDER (CMS-HCC): ICD-10-CM

## 2025-02-14 PROCEDURE — 99214 OFFICE O/P EST MOD 30 MIN: CPT | Performed by: NURSE PRACTITIONER

## 2025-02-14 RX ORDER — AMOXICILLIN AND CLAVULANATE POTASSIUM 875; 125 MG/1; MG/1
1 TABLET, FILM COATED ORAL 2 TIMES DAILY
COMMUNITY
Start: 2025-02-06 | End: 2025-02-16

## 2025-02-14 RX ORDER — OFLOXACIN 3 MG/ML
10 SOLUTION AURICULAR (OTIC)
COMMUNITY
Start: 2025-02-06

## 2025-02-14 ASSESSMENT — PATIENT HEALTH QUESTIONNAIRE - PHQ9
8. MOVING OR SPEAKING SO SLOWLY THAT OTHER PEOPLE COULD HAVE NOTICED. OR THE OPPOSITE, BEING SO FIGETY OR RESTLESS THAT YOU HAVE BEEN MOVING AROUND A LOT MORE THAN USUAL: SEVERAL DAYS
2. FEELING DOWN, DEPRESSED OR HOPELESS: SEVERAL DAYS
9. THOUGHTS THAT YOU WOULD BE BETTER OFF DEAD, OR OF HURTING YOURSELF: NOT AT ALL
1. LITTLE INTEREST OR PLEASURE IN DOING THINGS: NEARLY EVERY DAY
5. POOR APPETITE OR OVEREATING: NEARLY EVERY DAY
3. TROUBLE FALLING OR STAYING ASLEEP OR SLEEPING TOO MUCH: MORE THAN HALF THE DAYS
6. FEELING BAD ABOUT YOURSELF - OR THAT YOU ARE A FAILURE OR HAVE LET YOURSELF OR YOUR FAMILY DOWN: SEVERAL DAYS
7. TROUBLE CONCENTRATING ON THINGS, SUCH AS READING THE NEWSPAPER OR WATCHING TELEVISION: SEVERAL DAYS
4. FEELING TIRED OR HAVING LITTLE ENERGY: MORE THAN HALF THE DAYS
10. IF YOU CHECKED OFF ANY PROBLEMS, HOW DIFFICULT HAVE THESE PROBLEMS MADE IT FOR YOU TO DO YOUR WORK, TAKE CARE OF THINGS AT HOME, OR GET ALONG WITH OTHER PEOPLE: VERY DIFFICULT

## 2025-02-14 ASSESSMENT — ANXIETY QUESTIONNAIRES
IF YOU CHECKED OFF ANY PROBLEMS ON THIS QUESTIONNAIRE, HOW DIFFICULT HAVE THESE PROBLEMS MADE IT FOR YOU TO DO YOUR WORK, TAKE CARE OF THINGS AT HOME, OR GET ALONG WITH OTHER PEOPLE: VERY DIFFICULT
5. BEING SO RESTLESS THAT IT IS HARD TO SIT STILL: MORE THAN HALF THE DAYS
1. FEELING NERVOUS, ANXIOUS, OR ON EDGE: NEARLY EVERY DAY
3. WORRYING TOO MUCH ABOUT DIFFERENT THINGS: NEARLY EVERY DAY
4. TROUBLE RELAXING: MORE THAN HALF THE DAYS
2. NOT BEING ABLE TO STOP OR CONTROL WORRYING: NEARLY EVERY DAY
6. BECOMING EASILY ANNOYED OR IRRITABLE: NEARLY EVERY DAY
GAD7 TOTAL SCORE: 17
7. FEELING AFRAID AS IF SOMETHING AWFUL MIGHT HAPPEN: SEVERAL DAYS

## 2025-02-14 ASSESSMENT — ENCOUNTER SYMPTOMS: NERVOUS/ANXIOUS: 1

## 2025-02-14 NOTE — PROGRESS NOTES
"Adult Ambulatory Psychiatry Progress Note      Assessment/Plan     Impression:  Esvin Gabriel is a 59 y.o. male domiciled  (living separately) with 3 adult sons, employed as  who presents for follow up with CC of \"I am ok, but right now dealing with an ear infection and it is not working, and I am home so I didn't come in today and I need to go back and see them for something else. I am feeling a little stressful, as I had trouble getting the Klonopin. I did talk to the counselor at [facility] a couple of times, but I have been so sick with my ear, that I have been missing a lot of work lately and not seen him because I have been sick.\"    Plan: Will start the Klonopin slow taper when picking up the next refill. Otherwise no other changes. Patient was cooperative but very anxious if not reserved. Talked about increased anxiety and panic attacks, tying them to his not being happy with his job (still 3rd shift), his middle son's behaviors/poor decisions, and now dealing with an ear infection that won't go away, but also his internal conflict - knowing he needs to start coming down off of Klonopin but feels it is benefiting him. Admits he is willing to come off of it, and as he is doing counseling from a spiritual/rashaun based perspective, after discussion (and he had attempted to reduce his dose of Klonopin to 1mg in AM and 1.5mg in PM for 3 days without issue on his own), agreed that at the next appt, will start the taper. Reducing dose by 0.5mg every month until down to 1mg as needed. Reviewed and agreed to leaving medications and treatment plan in place at this time.     Medication: Cymbalta 60mg every day. Continues taking Klonopin 2mg twice daily for panic disorder.    Reviewed r/b/a, possible side effects of the medication. Client is aware about the benefit outweighs the risk.     Diagnoses and all orders for this visit:  BETTY (generalized anxiety disorder)  Obsessive thinking  Panic attack due " to exceptional stress  Complex posttraumatic stress disorder  Mild episode of recurrent major depressive disorder (CMS-HCC)        Therapy: none    Other: n/a    Patient is reminded that if there is SI to call [phone] and get themselves to the closest ED for evaluation, otherwise contact me for other questions/concerns.     Subjective   HPI:    Virtual Consent    An interactive audio and video telecommunication system which permits real time communications between the patient (at home) and provider (at  [facility] office) was utilized to provide this telehealth service.   Verbal consent was requested and obtained from Esvin Gabriel on this date, 02/14/2025 for a telehealth visit.     HPI:     Present Illness - anxiety, panic attacks     Characteristics/Recent psychiatric symptoms (pertinent positives and negatives) - reports stress from everyday life is weighing down on him - ear infection, having to get up daily for his 3rd shift job which he is getting tired of working and knows if he asks for 1st shift he would be denied, his middle son's ongoing issues which is worrying him, getting older w/ aches and pains. Reports this is causing both anxiety and depression to be higher and that is why he finds himself having to rely on the Klonopin to help him from having his panic attacks, as they are getting triggered by his racing and obsessive thoughts.  Reports seeing a counselor through [facility] and likes him as he has known him for 30 years and is comfortable seeing him. Reports that his counselor feels that by giving over his issues over to Agustín, and stop relying on the Klonopin, he will do better. The patient told the counselor that he knows he won't be able to full stop but wants to get to using it only PRN, so he is taking 2 a day, but then he tried taking 1 and 1/2 a day twice and he didn't feel edgy. Admits however concern that he doesn't trust putting too much rashaun in God with helping or 'fixing me.' Reveals the  biggest factor that impacts his depression has been not talking with his middle son in several months and knows he is struggling with his own issues - mentally, work wise, and he worries about him constantly, 'as I am a parent. But he is a grown man and he won't listen to me, so I worry for him, and that is what depresses me, but it is not every day life. I just don't want to see him fail.' The biggest happiness in his life are spending time with his granddaughters. Still is making effort to maintain boundaries with his middle son. Feels current dose of Cymbalta still manages his overall depression and anxiety. Can still struggles with his thoughts at times being circuitous still but is making effort to monitor them with how distracting and disengaging they can be with his thinking. Admits knowing the Klonopin takes a while to take effect, between 45-60 minutes, but knows it is a 'game changer' yet at the same time wants to try and start reducing the dose and had attempted to reduce the dose of Klonopin from 2mg twice daily to 2mg in the AM and 1.5mg in the PM recently twice and had not issues. Sleep still averages 7-7.5 hrs a night (works 3rd shift). Energy levels are stable and overall denies mental/physical fatigue, outside of being sick with an ear infection for the past 2 or so weeks, that has taken him down. Reports appetite is 'still too good and I could stand to lose a few pounds.' Admits the seasonal depression is slowly improving.     Onset/timeframe - 2 weeks  Type - panic attacks, anxiety  Duration - daily   Aggravating and/or relieving factors/triggers - dealing with ear infection, that has left him in pain, and away from work, leaving him edgy, still dealing with his son/trying to be a good dad/balanced with letting his son be a grown man and make his own decisions. Seeing a counselor through his [facility] and is asked to put his rashaun into something higher is something new to him, and is challenging but  willing to do it.  Treatment and treatment changes (new meds, dosage increases or decreases, med compliance, therapy frequency, etc.) (Past and Recent) - Cymbalta 60mg QD, Klonopin 2mg BID.       Issues: Denies SI/HI/AVH currently.           Review of Systems   Psychiatric/Behavioral:  The patient is nervous/anxious.        OARRS:  Adarsh Robles, APRN-CNP on 2/14/2025 11:19 AM  I have personally reviewed the OARRS report for Esvin Gabriel. I have considered the risks of abuse, dependence, addiction and diversion    Is the patient prescribed a combination of a benzodiazepine and opioid?  No    Last Urine Drug Screen / ordered today: Yes  Recent Results (from the past 8760 hours)   Confirmation Opiate/Opioid/Benzo Prescription Compliance    Collection Time: 05/31/24  2:19 PM   Result Value Ref Range    Clonazepam <25 <25 ng/mL    7-Aminoclonazepam 232 (H) <25 ng/mL    Alprazolam <25 <25 ng/mL    Alpha-Hydroxyalprazolam <25 <25 ng/mL    Midazolam <25 <25 ng/mL    Alpha-Hydroxymidazolam <25 <25 ng/mL    Chlordiazepoxide <25 <25 ng/mL    Diazepam <25 <25 ng/mL    Nordiazepam <25 <25 ng/mL    Temazepam <25 <25 ng/mL    Oxazepam <25 <25 ng/mL    Lorazepam <25 <25 ng/mL    Methadone <25 <25 ng/mL    EDDP <25 <25 ng/mL    6-Acetylmorphine <25 <25 ng/mL    Codeine <50 <50 ng/mL    Hydrocodone <25 <25 ng/mL    Hydromorphone <25 <25 ng/mL    Morphine  <50 <50 ng/mL    Norhydrocodone <25 <25 ng/mL    Noroxycodone <25 <25 ng/mL    Oxycodone <25 <25 ng/mL    Oxymorphone <25 <25 ng/mL    Fentanyl <2.5 <2.5 ng/mL    Norfentanyl <2.5 <2.5 ng/mL    Tramadol <50 <50 ng/mL    O-Desmethyltramadol <50 <50 ng/mL    Zolpidem <25 <25 ng/mL    Zolpidem Metabolite (ZCA) <25 <25 ng/mL   Screen Opiate/Opioid/Benzo Prescription Compliance    Collection Time: 05/31/24  2:19 PM   Result Value Ref Range    Creatinine, Urine Random 188.4 20.0 - 370.0 mg/dL    Amphetamine Screen, Urine Presumptive Negative Presumptive Negative    Barbiturate Screen,  Urine Presumptive Negative Presumptive Negative    Cannabinoid Screen, Urine Presumptive Negative Presumptive Negative    Cocaine Metabolite Screen, Urine Presumptive Negative Presumptive Negative    PCP Screen, Urine Presumptive Negative Presumptive Negative     Results are as expected.         Controlled Substance Agreement:  Date of the Last Agreement: 04/19/2024  Reviewed Controlled Substance Agreement including but not limited to the benefits, risks, and alternatives to treatment with a Controlled Substance medication(s).    Benzodiazepines:  What is the patient's goal of therapy? Stable anxiety/panic feeling  Is this being achieved with current treatment? yes    Activities of Daily Living:   Is your overall impression that this patient is benefiting (symptom reduction outweighs side effects) from benzodiazepine therapy? Yes     1. Physical Functioning: Better  2. Family Relationship: Same  3. Social Relationship: Better  4. Mood: Better  5. Sleep Patterns: Better  6. Overall Function: Better    Objective   Mental Status Exam:  General Appearance: Fairly groomed  Attitude/Behavior: Cooperative, Distracted  Motor: Fidgeting  Speech: Rapid Speech, pressured, Other: (comment) (perseverative, obvious stutter at times)  Gait/Station: Other:(comment) (sitting in bedroom at home, over virtual connection)  Mood: 'ok, tired'  Affect: Dysphoric, constricted, Anxious, Increased intensity, Congruent with mood and topic of conversation  Thought Process: Perseverative, Flight of ideas, Other: (comment) (obsessive, ruminating, circular)  Thought Associations: No loosening of associations  Thought Content: Other: (comment) (worrying about controlling his thoughts, which still perseverate about coming off of Klonopin and wants to come off of it yet worries at the same time of coming off of it.)  Perception: No perceptual abnormalities noted  Sensorium: Alert and oriented to person, place, time and situation  Insight:  Limited  Judgement: Fair  Cognition: Cognitively intact to conversational testing with respect to attention, orientation, fund of knowledge, recent and remote memory, and language  Testing: N/A    BETTY-7/PHQ-9 scores reviewed: 17, 14 compared to 15, 5 reflecting a small increase in anxiety and a jump in depression.    Current Medications:  Current Outpatient Medications on File Prior to Visit   Medication Sig Dispense Refill    amoxicillin-pot clavulanate (Augmentin) 875-125 mg tablet Take 1 tablet by mouth twice a day.      clonazePAM (KlonoPIN) 2 mg tablet Take 1 tablet (2 mg) by mouth 2 times a day. 60 tablet 1    doxylamine-DM-acetaminophen (Vicks Nyquil Nighttime Relief) 6.25- mg/15 mL liquid Take 15 mL by mouth as needed at bedtime (cold/flu).      DULoxetine (Cymbalta) 30 mg DR capsule Take 2 capsules (60 mg) by mouth once daily. 180 capsule 3    multivitamin tablet Take 1 tablet by mouth once daily. Equate from [employer]      ofloxacin (Floxin) 0.3 % otic solution Administer 10 drops into affected ear(s) once daily.      [DISCONTINUED] clonazePAM (KlonoPIN) 2 mg tablet Take 1 tablet (2 mg) by mouth 2 times a day. 60 tablet 1     No current facility-administered medications on file prior to visit.       Lab Review:   No visits with results within 2 Month(s) from this visit.   Latest known visit with results is:   Telemedicine on 05/31/2024   Component Date Value    Creatinine, Urine Random 05/31/2024 188.4     Amphetamine Screen, Urine 05/31/2024 Presumptive Negative     Barbiturate Screen, Urine 05/31/2024 Presumptive Negative     Cannabinoid Screen, Urine 05/31/2024 Presumptive Negative     Cocaine Metabolite Scree* 05/31/2024 Presumptive Negative     PCP Screen, Urine 05/31/2024 Presumptive Negative     Clonazepam 05/31/2024 <25     7-Aminoclonazepam 05/31/2024 232 (H)     Alprazolam 05/31/2024 <25     Alpha-Hydroxyalprazolam 05/31/2024 <25     Midazolam 05/31/2024 <25     Alpha-Hydroxymidazolam  05/31/2024 <25     Chlordiazepoxide 05/31/2024 <25     Diazepam 05/31/2024 <25     Nordiazepam 05/31/2024 <25     Temazepam 05/31/2024 <25     Oxazepam 05/31/2024 <25     Lorazepam 05/31/2024 <25     Methadone 05/31/2024 <25     EDDP 05/31/2024 <25     6-Acetylmorphine 05/31/2024 <25     Codeine 05/31/2024 <50     Hydrocodone 05/31/2024 <25     Hydromorphone 05/31/2024 <25     Morphine  05/31/2024 <50     Norhydrocodone 05/31/2024 <25     Noroxycodone 05/31/2024 <25     Oxycodone 05/31/2024 <25     Oxymorphone 05/31/2024 <25     Fentanyl 05/31/2024 <2.5     Norfentanyl 05/31/2024 <2.5     Tramadol 05/31/2024 <50     O-Desmethyltramadol 05/31/2024 <50     Zolpidem 05/31/2024 <25     Zolpidem Metabolite (ZCA) 05/31/2024 <25          Time Spent:    Prep time: 1 min.  Direct patient time: 32 min.  Documentation time: 5 min.  Total time: 38 min.    Next Appointment:  Follow up in 8 weeks (on 4/10/2025).

## 2025-04-10 ENCOUNTER — APPOINTMENT (OUTPATIENT)
Dept: BEHAVIORAL HEALTH | Facility: CLINIC | Age: 60
End: 2025-04-10
Payer: COMMERCIAL

## 2025-04-10 DIAGNOSIS — F43.10 COMPLEX POSTTRAUMATIC STRESS DISORDER: ICD-10-CM

## 2025-04-10 DIAGNOSIS — F42.8 OBSESSIVE THINKING: ICD-10-CM

## 2025-04-10 DIAGNOSIS — F41.0 PANIC ATTACK DUE TO EXCEPTIONAL STRESS: ICD-10-CM

## 2025-04-10 DIAGNOSIS — F43.21 FEELING GRIEF: ICD-10-CM

## 2025-04-10 DIAGNOSIS — F41.1 GAD (GENERALIZED ANXIETY DISORDER): Primary | ICD-10-CM

## 2025-04-10 DIAGNOSIS — F33.1 MODERATE EPISODE OF RECURRENT MAJOR DEPRESSIVE DISORDER: ICD-10-CM

## 2025-04-10 DIAGNOSIS — F43.0 PANIC ATTACK DUE TO EXCEPTIONAL STRESS: ICD-10-CM

## 2025-04-10 PROCEDURE — 99214 OFFICE O/P EST MOD 30 MIN: CPT | Performed by: NURSE PRACTITIONER

## 2025-04-10 RX ORDER — CLONAZEPAM 2 MG/1
2 TABLET ORAL 2 TIMES DAILY
Qty: 60 TABLET | Refills: 1 | Status: SHIPPED | OUTPATIENT
Start: 2025-04-10 | End: 2025-06-09

## 2025-04-10 ASSESSMENT — PATIENT HEALTH QUESTIONNAIRE - PHQ9
2. FEELING DOWN, DEPRESSED OR HOPELESS: MORE THAN HALF THE DAYS
4. FEELING TIRED OR HAVING LITTLE ENERGY: MORE THAN HALF THE DAYS
8. MOVING OR SPEAKING SO SLOWLY THAT OTHER PEOPLE COULD HAVE NOTICED. OR THE OPPOSITE, BEING SO FIGETY OR RESTLESS THAT YOU HAVE BEEN MOVING AROUND A LOT MORE THAN USUAL: NOT AT ALL
1. LITTLE INTEREST OR PLEASURE IN DOING THINGS: SEVERAL DAYS
6. FEELING BAD ABOUT YOURSELF - OR THAT YOU ARE A FAILURE OR HAVE LET YOURSELF OR YOUR FAMILY DOWN: SEVERAL DAYS
9. THOUGHTS THAT YOU WOULD BE BETTER OFF DEAD, OR OF HURTING YOURSELF: NOT AT ALL
5. POOR APPETITE OR OVEREATING: SEVERAL DAYS
10. IF YOU CHECKED OFF ANY PROBLEMS, HOW DIFFICULT HAVE THESE PROBLEMS MADE IT FOR YOU TO DO YOUR WORK, TAKE CARE OF THINGS AT HOME, OR GET ALONG WITH OTHER PEOPLE: VERY DIFFICULT
7. TROUBLE CONCENTRATING ON THINGS, SUCH AS READING THE NEWSPAPER OR WATCHING TELEVISION: SEVERAL DAYS
3. TROUBLE FALLING OR STAYING ASLEEP OR SLEEPING TOO MUCH: NEARLY EVERY DAY

## 2025-04-10 ASSESSMENT — ANXIETY QUESTIONNAIRES
6. BECOMING EASILY ANNOYED OR IRRITABLE: MORE THAN HALF THE DAYS
1. FEELING NERVOUS, ANXIOUS, OR ON EDGE: SEVERAL DAYS
5. BEING SO RESTLESS THAT IT IS HARD TO SIT STILL: SEVERAL DAYS
2. NOT BEING ABLE TO STOP OR CONTROL WORRYING: NEARLY EVERY DAY
7. FEELING AFRAID AS IF SOMETHING AWFUL MIGHT HAPPEN: NOT AT ALL
4. TROUBLE RELAXING: MORE THAN HALF THE DAYS
3. WORRYING TOO MUCH ABOUT DIFFERENT THINGS: SEVERAL DAYS
IF YOU CHECKED OFF ANY PROBLEMS ON THIS QUESTIONNAIRE, HOW DIFFICULT HAVE THESE PROBLEMS MADE IT FOR YOU TO DO YOUR WORK, TAKE CARE OF THINGS AT HOME, OR GET ALONG WITH OTHER PEOPLE: VERY DIFFICULT
GAD7 TOTAL SCORE: 10

## 2025-04-10 ASSESSMENT — ENCOUNTER SYMPTOMS: NERVOUS/ANXIOUS: 1

## 2025-04-10 NOTE — PROGRESS NOTES
"Adult Ambulatory Psychiatry Progress Note      Assessment/Plan     Impression:  Esvin Gabriel is a 59 y.o. male domiciled  (living separately) with 3 adult sons, employed as  who presents for follow up with CC of \"My dad passed away a month ago, so it has been rough the past few weeks. I took a week off from work and went to [place] for 3 days. I knew he was sick for awhile, but communication did stop in 2024, as my half brother did not care to tell me last year and I got the call while he was on his last breaths, essentially. I am ok, other than that.\"    Plan: Patient was cooperative but anxious and down. Estranged father  a month ago, issues with middle son ongoing and still working on how to reduce dependence on Klonopin. Continues to see his counselor through [facility] to work on his issues with his son and dependence on Klonopin. Will discuss at next appt about starting taper off of Klonopin. Reviewed and agreed to leaving medications and treatment plan in place at this time.     Medication: Cymbalta 60mg every day. Continues taking Klonopin 2mg twice daily for panic disorder.    Reviewed r/b/a, possible side effects of the medication. Client is aware about the benefit outweighs the risk.     Diagnoses and all orders for this visit:  BETTY (generalized anxiety disorder)  Panic attack due to exceptional stress  -     clonazePAM (KlonoPIN) 2 mg tablet; Take 1 tablet (2 mg) by mouth 2 times a day.  Obsessive thinking  Complex posttraumatic stress disorder  Moderate episode of recurrent major depressive disorder  Feeling grief        Therapy: none    Other: n/a    Patient is reminded that if there is SI to call [phone] and get themselves to the closest ED for evaluation, otherwise contact me for other questions/concerns.     Subjective   HPI:    Virtual Consent    An interactive audio and video telecommunication system which permits real time communications between the patient (at " home) and provider (at home office) was utilized to provide this telehealth service.   Verbal consent was requested and obtained from Esvin Gabriel on this date, 04/10/2025 for a telehealth visit.     HPI:     Present Illness - anxiety, depression     Characteristics/Recent psychiatric symptoms (pertinent positives and negatives) - reports he is feeling sad and missing his father as he passed a month ago, as they had reconnected one last time a year ago for several months, and they would cycle through periods of being close, as his father would get to know his own sons and then disappear from everyone's lives. Reflects on how he wants to be the best father to his own sons, as 'you will always be a parent to you die' but knows that his middle son will always be a problem and knows that he will be an issue, as he was disrespectful towards his mother, the patient's wife and wouldn't help him out, but the patient fluctuates with enabling his son, and after talking with his counselor at [facility], 'I am not going to do that anymore and help him. He needs to learn to figure things out on his own.' Reveals when he was in [facility] for his father's , he left his Klonopin behind for the 3 days in March, he did get into a situation where he felt anxious but it didn't blow up into a full on panic attack and was glad. Acknowledges that the Klonopin was a 'game changer' for him over the years with managing his anxiety in general but panic attacks specifically. His counselor through [facility] does want him to put his rashaun in God to not need to use the Klonopin at all to help him with his anxiety. Patient feels the anxiety and depression overall are stable but his biggest stressor in his life is his son, and while he wants to always be there for him, as he is his father, he also knows that he needs to keep a healthy boundary and try to not hold his hand as he is also tired of dealing with how nasty and rude and manipulative he has  "been. \"If he has to fail, he will fail and learn on his own.\" Work stress is back to being manageable as he is in the middle of training 3 new hires and feels the workload will be redistributed evenly again. Reports noticing that he tries to keep himself active - as he is on his feet at work, and when he is with his granddaughters and take them to a local [employer] park to play, 'they keep me active and busy, I feel good and happy about myself.' Sleep averages 7-7.5 hrs a night (as he works 3rd shift). He and his wife are doing well, 'and my one granddaughter told me that she told my wife that I work too much. She is very observent so I try to keep myself occupied and keep myself around the kids.'  Energy levels are stable and overall denies mental/physical fatigue, but admits that as his grandkids keep him moving, he is physically tired more but for 'good reasons.' Appetite is slightly higher. Reports more racing and obsessive thoughts about his son, but denies ruminating and intrusive thoughts. Admits the seasonal depression is slowly improving.     Onset/timeframe - 4 weeks  Type - depression, anxiety, grief  Duration - situational  Aggravating and/or relieving factors/triggers - father passed away (though estranged since last year), dealing with son's behaviors still, trying to make sure he gets to a place of not needing to be overly reliant on Klonopin (ongoing)  Treatment and treatment changes (new meds, dosage increases or decreases, med compliance, therapy frequency, etc.) (Past and Recent) - Cymbalta 60mg QD, Klonopin 2mg BID.       Issues: Denies SI/HI/AVH currently.           Review of Systems   Psychiatric/Behavioral:  Positive for dysphoric mood. The patient is nervous/anxious.        OARRS:  STEF Yan-CNP on 4/13/2025  4:46 PM  I have personally reviewed the OARRS report for Esvin Gabriel. I have considered the risks of abuse, dependence, addiction and diversion    Is the patient prescribed " a combination of a benzodiazepine and opioid?  No    Last Urine Drug Screen / ordered today: Yes  Recent Results (from the past 8760 hours)   Confirmation Opiate/Opioid/Benzo Prescription Compliance    Collection Time: 05/31/24  2:19 PM   Result Value Ref Range    Clonazepam <25 <25 ng/mL    7-Aminoclonazepam 232 (H) <25 ng/mL    Alprazolam <25 <25 ng/mL    Alpha-Hydroxyalprazolam <25 <25 ng/mL    Midazolam <25 <25 ng/mL    Alpha-Hydroxymidazolam <25 <25 ng/mL    Chlordiazepoxide <25 <25 ng/mL    Diazepam <25 <25 ng/mL    Nordiazepam <25 <25 ng/mL    Temazepam <25 <25 ng/mL    Oxazepam <25 <25 ng/mL    Lorazepam <25 <25 ng/mL    Methadone <25 <25 ng/mL    EDDP <25 <25 ng/mL    6-Acetylmorphine <25 <25 ng/mL    Codeine <50 <50 ng/mL    Hydrocodone <25 <25 ng/mL    Hydromorphone <25 <25 ng/mL    Morphine  <50 <50 ng/mL    Norhydrocodone <25 <25 ng/mL    Noroxycodone <25 <25 ng/mL    Oxycodone <25 <25 ng/mL    Oxymorphone <25 <25 ng/mL    Fentanyl <2.5 <2.5 ng/mL    Norfentanyl <2.5 <2.5 ng/mL    Tramadol <50 <50 ng/mL    O-Desmethyltramadol <50 <50 ng/mL    Zolpidem <25 <25 ng/mL    Zolpidem Metabolite (ZCA) <25 <25 ng/mL   Screen Opiate/Opioid/Benzo Prescription Compliance    Collection Time: 05/31/24  2:19 PM   Result Value Ref Range    Creatinine, Urine Random 188.4 20.0 - 370.0 mg/dL    Amphetamine Screen, Urine Presumptive Negative Presumptive Negative    Barbiturate Screen, Urine Presumptive Negative Presumptive Negative    Cannabinoid Screen, Urine Presumptive Negative Presumptive Negative    Cocaine Metabolite Screen, Urine Presumptive Negative Presumptive Negative    PCP Screen, Urine Presumptive Negative Presumptive Negative     Results are as expected.         Controlled Substance Agreement:  Date of the Last Agreement: 04/19/2024  Reviewed Controlled Substance Agreement including but not limited to the benefits, risks, and alternatives to treatment with a Controlled Substance  medication(s).    Benzodiazepines:  What is the patient's goal of therapy? Stable anxiety/panic feeling  Is this being achieved with current treatment? yes    Activities of Daily Living:   Is your overall impression that this patient is benefiting (symptom reduction outweighs side effects) from benzodiazepine therapy? Yes     1. Physical Functioning: Same  2. Family Relationship: Same  3. Social Relationship: Same  4. Mood: Same  5. Sleep Patterns: Better  6. Overall Function: Same    Objective   Mental Status Exam:  General Appearance: Well groomed, appropriate eye contact  Attitude/Behavior: Cooperative, Distracted  Motor: No psychomotor agitation or retardation, no tremor or other abnormal movements  Speech: Rapid Speech, pressured, Other: (comment) (perseverative)  Gait/Station: Other:(comment) (sitting chair of bedroom, over virtual connection)  Mood: 'rough, but ok'  Affect: Dysphoric, constricted, Anxious, Congruent with mood and topic of conversation  Thought Process: Linear, goal directed, Perseverative, Flight of ideas  Thought Associations: No loosening of associations  Thought Content: Normal, Other: (comment) (grieving still over loss of his father but getting better, still struggling with his middle son's behaviors & trying to put in boundaries, doing counseling through [facility] and anxiety is better but trying to justify Klonopin while knowing he needs to stop)  Perception: No perceptual abnormalities noted  Sensorium: Alert and oriented to person, place, time and situation  Insight: Fair  Judgement: Fair  Cognition: Cognitively intact to conversational testing with respect to attention, orientation, fund of knowledge, recent and remote memory, and language  Testing: N/A    BETTY-7/PHQ-9 scores reviewed: 10, 11 compared to 17, 14 reflecting improvement in anxiety and depression.    Current Medications:  Current Outpatient Medications on File Prior to Visit   Medication Sig Dispense Refill     doxylamine-DM-acetaminophen (Vicks Nyquil Nighttime Relief) 6.25- mg/15 mL liquid Take 15 mL by mouth as needed at bedtime (cold/flu).      DULoxetine (Cymbalta) 30 mg DR capsule Take 2 capsules (60 mg) by mouth once daily. 180 capsule 3    multivitamin tablet Take 1 tablet by mouth once daily. Equate from [employer]      [DISCONTINUED] clonazePAM (KlonoPIN) 2 mg tablet Take 1 tablet (2 mg) by mouth 2 times a day. 60 tablet 1    [DISCONTINUED] ofloxacin (Floxin) 0.3 % otic solution Administer 10 drops into affected ear(s) once daily. (Patient not taking: Reported on 4/10/2025)       No current facility-administered medications on file prior to visit.       Lab Review:   No visits with results within 2 Month(s) from this visit.   Latest known visit with results is:   Telemedicine on 05/31/2024   Component Date Value    Creatinine, Urine Random 05/31/2024 188.4     Amphetamine Screen, Urine 05/31/2024 Presumptive Negative     Barbiturate Screen, Urine 05/31/2024 Presumptive Negative     Cannabinoid Screen, Urine 05/31/2024 Presumptive Negative     Cocaine Metabolite Scree* 05/31/2024 Presumptive Negative     PCP Screen, Urine 05/31/2024 Presumptive Negative     Clonazepam 05/31/2024 <25     7-Aminoclonazepam 05/31/2024 232 (H)     Alprazolam 05/31/2024 <25     Alpha-Hydroxyalprazolam 05/31/2024 <25     Midazolam 05/31/2024 <25     Alpha-Hydroxymidazolam 05/31/2024 <25     Chlordiazepoxide 05/31/2024 <25     Diazepam 05/31/2024 <25     Nordiazepam 05/31/2024 <25     Temazepam 05/31/2024 <25     Oxazepam 05/31/2024 <25     Lorazepam 05/31/2024 <25     Methadone 05/31/2024 <25     EDDP 05/31/2024 <25     6-Acetylmorphine 05/31/2024 <25     Codeine 05/31/2024 <50     Hydrocodone 05/31/2024 <25     Hydromorphone 05/31/2024 <25     Morphine  05/31/2024 <50     Norhydrocodone 05/31/2024 <25     Noroxycodone 05/31/2024 <25     Oxycodone 05/31/2024 <25     Oxymorphone 05/31/2024 <25     Fentanyl 05/31/2024 <2.5      Norfentanyl 05/31/2024 <2.5     Tramadol 05/31/2024 <50     O-Desmethyltramadol 05/31/2024 <50     Zolpidem 05/31/2024 <25     Zolpidem Metabolite (ZCA) 05/31/2024 <25          Time Spent:    Prep time: 1 min.  Direct patient time: 30 min.  Documentation time: 5 min.  Total time: 36 min.    Next Appointment:  Follow up in 7 weeks (on 5/30/2025).

## 2025-04-13 PROBLEM — F43.21 FEELING GRIEF: Status: ACTIVE | Noted: 2025-04-13

## 2025-04-13 PROBLEM — F33.1 MODERATE EPISODE OF RECURRENT MAJOR DEPRESSIVE DISORDER: Status: ACTIVE | Noted: 2025-04-13

## 2025-04-13 ASSESSMENT — ENCOUNTER SYMPTOMS: DYSPHORIC MOOD: 1

## 2025-05-30 ENCOUNTER — TELEMEDICINE (OUTPATIENT)
Dept: BEHAVIORAL HEALTH | Facility: CLINIC | Age: 60
End: 2025-05-30
Payer: COMMERCIAL

## 2025-05-30 DIAGNOSIS — F41.0 PANIC ATTACK DUE TO EXCEPTIONAL STRESS: ICD-10-CM

## 2025-05-30 DIAGNOSIS — F43.21 FEELING GRIEF: ICD-10-CM

## 2025-05-30 DIAGNOSIS — F43.10 COMPLEX POSTTRAUMATIC STRESS DISORDER: ICD-10-CM

## 2025-05-30 DIAGNOSIS — F43.0 PANIC ATTACK DUE TO EXCEPTIONAL STRESS: ICD-10-CM

## 2025-05-30 DIAGNOSIS — F41.1 GAD (GENERALIZED ANXIETY DISORDER): Primary | ICD-10-CM

## 2025-05-30 DIAGNOSIS — F33.1 MODERATE EPISODE OF RECURRENT MAJOR DEPRESSIVE DISORDER: ICD-10-CM

## 2025-05-30 DIAGNOSIS — F42.8 OBSESSIVE THINKING: ICD-10-CM

## 2025-05-30 PROCEDURE — 99214 OFFICE O/P EST MOD 30 MIN: CPT | Performed by: NURSE PRACTITIONER

## 2025-05-30 RX ORDER — CLONAZEPAM 2 MG/1
2 TABLET ORAL 2 TIMES DAILY
Qty: 60 TABLET | Refills: 1 | Status: SHIPPED | OUTPATIENT
Start: 2025-06-07 | End: 2025-08-06

## 2025-05-30 ASSESSMENT — ANXIETY QUESTIONNAIRES
5. BEING SO RESTLESS THAT IT IS HARD TO SIT STILL: SEVERAL DAYS
2. NOT BEING ABLE TO STOP OR CONTROL WORRYING: SEVERAL DAYS
IF YOU CHECKED OFF ANY PROBLEMS ON THIS QUESTIONNAIRE, HOW DIFFICULT HAVE THESE PROBLEMS MADE IT FOR YOU TO DO YOUR WORK, TAKE CARE OF THINGS AT HOME, OR GET ALONG WITH OTHER PEOPLE: SOMEWHAT DIFFICULT
4. TROUBLE RELAXING: NOT AT ALL
1. FEELING NERVOUS, ANXIOUS, OR ON EDGE: SEVERAL DAYS
7. FEELING AFRAID AS IF SOMETHING AWFUL MIGHT HAPPEN: NOT AT ALL
3. WORRYING TOO MUCH ABOUT DIFFERENT THINGS: MORE THAN HALF THE DAYS
6. BECOMING EASILY ANNOYED OR IRRITABLE: MORE THAN HALF THE DAYS
GAD7 TOTAL SCORE: 7

## 2025-05-30 ASSESSMENT — PATIENT HEALTH QUESTIONNAIRE - PHQ9
1. LITTLE INTEREST OR PLEASURE IN DOING THINGS: MORE THAN HALF THE DAYS
9. THOUGHTS THAT YOU WOULD BE BETTER OFF DEAD, OR OF HURTING YOURSELF: NOT AT ALL
7. TROUBLE CONCENTRATING ON THINGS, SUCH AS READING THE NEWSPAPER OR WATCHING TELEVISION: SEVERAL DAYS
8. MOVING OR SPEAKING SO SLOWLY THAT OTHER PEOPLE COULD HAVE NOTICED. OR THE OPPOSITE, BEING SO FIGETY OR RESTLESS THAT YOU HAVE BEEN MOVING AROUND A LOT MORE THAN USUAL: NOT AT ALL
2. FEELING DOWN, DEPRESSED OR HOPELESS: SEVERAL DAYS
4. FEELING TIRED OR HAVING LITTLE ENERGY: NEARLY EVERY DAY
3. TROUBLE FALLING OR STAYING ASLEEP OR SLEEPING TOO MUCH: NEARLY EVERY DAY
10. IF YOU CHECKED OFF ANY PROBLEMS, HOW DIFFICULT HAVE THESE PROBLEMS MADE IT FOR YOU TO DO YOUR WORK, TAKE CARE OF THINGS AT HOME, OR GET ALONG WITH OTHER PEOPLE: SOMEWHAT DIFFICULT
6. FEELING BAD ABOUT YOURSELF - OR THAT YOU ARE A FAILURE OR HAVE LET YOURSELF OR YOUR FAMILY DOWN: NOT AT ALL
5. POOR APPETITE OR OVEREATING: NEARLY EVERY DAY

## 2025-05-30 ASSESSMENT — ENCOUNTER SYMPTOMS
NERVOUS/ANXIOUS: 1
DYSPHORIC MOOD: 1

## 2025-05-30 NOTE — PROGRESS NOTES
"Adult Ambulatory Psychiatry Progress Note      Assessment/Plan     Impression:  Esvin Gabriel is a 59 y.o. male domiciled  (living separately) with 3 adult sons, employed as  who presents for follow up with CC of \"I am doing ok. I thought this appt was supposed to be in person - so I think something got messed up. I am still dealing with some depression, since my dad passed as I couldn't say any last words to him, but I am moving on - keeping myself busy with work, the grand kids. The medications are still working. I am still seeing the jackie at [facility] as he helps me out, and as he is a big [employer] and that God will help me out. Things are ok.\"    Plan: Patient was cooperative and while less anxious, indicated he was still feeling down. Still processing his father's death (lack of closure) and dealing with his adult middle son's behaviors (unable to let him deal with his own consequences). However as he is happy spending time with his grand kids, and looking forward to retiring next year, he looks for the happiness in his life. Still doing counseling through friend at [facility]. Reviewed and agreed to leaving medications and treatment plan in place. Appt was to be done in person today but due to conflict when it was scheduled and ended up being rescheduled, the next appointment will be done in person, at which time, the Klonopin taper will begin - starting reduction of 2mg to 1.75mg (0.25mg every 2 months).      Medication: Cymbalta 60mg every day. Continues taking Klonopin 2mg twice daily for panic disorder.    Reviewed r/b/a, possible side effects of the medication. Client is aware about the benefit outweighs the risk.     Diagnoses and all orders for this visit:  BETTY (generalized anxiety disorder)  Panic attack due to exceptional stress  -     clonazePAM (KlonoPIN) 2 mg tablet; Take 1 tablet (2 mg) by mouth 2 times a day. Do not fill before June 7, 2025.  Obsessive thinking  Complex " posttraumatic stress disorder  Moderate episode of recurrent major depressive disorder  Feeling grief          Therapy: none    Other: n/a    Patient is reminded that if there is SI to call [phone] and get themselves to the closest ED for evaluation, otherwise contact me for other questions/concerns.     Subjective   HPI:    Virtual Consent    An interactive audio and video telecommunication system which permits real time communications between the patient (at home) and provider (at  [facility] office) was utilized to provide this telehealth service.   Verbal consent was requested and obtained from Esvin Gabriel on this date, 05/30/2025 for a telehealth visit.     HPI:     Present Illness - anxiety, depression     Characteristics/Recent psychiatric symptoms (pertinent positives and negatives) - reports he is feeling sad and missing his father - still can grieve over him at times, but feels that with time he is healing, however his middle son who has been making more poor decisions as of late (police got involved and he had to step in and admits he feels he is still responsible for his son, even though he is an adult) and knows that he will always be a worry for him. Also as his own mother is getting older (approaching 80) and her own health isn't the best, he does feel sad that one day she will be gone. However he feels these are things that he has to get used to, as that is part of life, and admits his depression is manageable otherwise. Sleep hasn't been the best, as he is oversleeping, as he is dealing with racing, obsessive thoughts that are keeping him awake longer than he wants, and ends up oversleeping, often napping and getting 9-10 hours a 'night' as he works nights. This will leave him tired through the day and has to push himself to stay awake more often than not as he wants to spend time with his grand kids, especially as his one grand daughter reminds him that 'Grandpa, you sleep too much.' Reports can find  himself often ruminating about his lack of never having gotten to say good bye to his dad, as they stopped talking towards the end of his life, and relates this to how his relationship with his own son, who has his issues to contend with (and is aware that he needs to let his son deal with his problems on his own, but 'I am his parent until I die.'), and continues to be ongoing currently and doesn't want that to happen  and let history repeat itself. Still goes to [facility] and seeing the friend who does 'therapy' with him. Feels anxiety is stable, with the Cymbalta and Klonopin in play and wants to start tapering off of the Klonopin next month. Appetite has 'been high. I am overweight and need to lose it. I need to stop drinking regular pop and switch to Coke Zero. But it isn't easy.' Energy levels are low. Admits to ongoing racing, obsessive, and at times intrusive thoughts.     Onset/timeframe - 4 weeks  Type - depression, anxiety, grief  Duration - situational  Aggravating and/or relieving factors/triggers - still processing his father's passing (closure), dealing with son's behaviors (recent run in with the law and intervening and not letting his grown son deal with the consequences of his actions), but feels good having his grand kids and deciding next year he will retire, to relax. Still doing counseling through [facility] but has mixed feelings about the notion of letting God take control of things.  Treatment and treatment changes (new meds, dosage increases or decreases, med compliance, therapy frequency, etc.) (Past and Recent) - Cymbalta 60mg QD, Klonopin 2mg BID.       Issues: Denies SI/HI/AVH currently.     Reveals considering July 2026 as when he retires as he feels his body is hitting it's limit with physical labor. His mother is finally recovering from being sick for 2 weeks.          Review of Systems   Psychiatric/Behavioral:  Positive for dysphoric mood. The patient is nervous/anxious.         OARRS:  Adarsh Robles, APRN-CNP on 5/30/2025  1:35 PM  I have personally reviewed the OARRS report for Esvin Gabriel. I have considered the risks of abuse, dependence, addiction and diversion    Is the patient prescribed a combination of a benzodiazepine and opioid?  No    Last Urine Drug Screen / ordered today: Yes  No results found for this or any previous visit (from the past 8760 hours).    Results are as expected.         Controlled Substance Agreement:  Date of the Last Agreement: 04/19/2024  Reviewed Controlled Substance Agreement including but not limited to the benefits, risks, and alternatives to treatment with a Controlled Substance medication(s).    Benzodiazepines:  What is the patient's goal of therapy? Stable anxiety/panic feeling  Is this being achieved with current treatment? yes    Activities of Daily Living:   Is your overall impression that this patient is benefiting (symptom reduction outweighs side effects) from benzodiazepine therapy? Yes     1. Physical Functioning: Same  2. Family Relationship: Same  3. Social Relationship: Same  4. Mood: Same  5. Sleep Patterns: Better  6. Overall Function: Same    Objective   Mental Status Exam:  General Appearance: Well groomed, appropriate eye contact  Attitude/Behavior: Cooperative, Distracted, Fair eye contact  Motor: No psychomotor agitation or retardation, no tremor or other abnormal movements  Speech: Rapid Speech, pressured, Other: (comment) (perseverative, requiring interruption and redirection)  Gait/Station: Other:(comment) (sitting outside on backporch (for better reception), over virtual connection)  Mood: 'down some'  Affect: Dysphoric, constricted but reactive, Anxious, Congruent with mood and topic of conversation  Thought Process: Linear, goal directed, Perseverative, Flight of ideas  Thought Associations: No loosening of associations  Thought Content: Normal, Other: (comment) (still dealing with some lingering grief over losing his  father (and not having time to have closure), and dealing with middle son's ongoing poor life choices/decisions and not letting him deal with his consequences on his own as he is an adult)  Perception: No perceptual abnormalities noted  Sensorium: Alert and oriented to person, place, time and situation  Insight: Fair  Judgement: Fair  Cognition: Cognitively intact to conversational testing with respect to attention, orientation, fund of knowledge, recent and remote memory, and language  Testing: N/A    BETTY-7/PHQ-9 scores reviewed: 7, 13 compared to 10, 11 reflecting improvement in anxiety but a small rebound in depression.    Current Medications:  Current Outpatient Medications on File Prior to Visit   Medication Sig Dispense Refill    doxylamine-DM-acetaminophen (Vicks Nyquil Nighttime Relief) 6.25- mg/15 mL liquid Take 15 mL by mouth as needed at bedtime (cold/flu).      DULoxetine (Cymbalta) 30 mg DR capsule Take 2 capsules (60 mg) by mouth once daily. 180 capsule 3    multivitamin tablet Take 1 tablet by mouth once daily. Equate from [employer]      [DISCONTINUED] clonazePAM (KlonoPIN) 2 mg tablet Take 1 tablet (2 mg) by mouth 2 times a day. 60 tablet 1     No current facility-administered medications on file prior to visit.       Lab Review:   No visits with results within 2 Month(s) from this visit.   Latest known visit with results is:   Telemedicine on 05/31/2024   Component Date Value    Creatinine, Urine Random 05/31/2024 188.4     Amphetamine Screen, Urine 05/31/2024 Presumptive Negative     Barbiturate Screen, Urine 05/31/2024 Presumptive Negative     Cannabinoid Screen, Urine 05/31/2024 Presumptive Negative     Cocaine Metabolite Scree* 05/31/2024 Presumptive Negative     PCP Screen, Urine 05/31/2024 Presumptive Negative     Clonazepam 05/31/2024 <25     7-Aminoclonazepam 05/31/2024 232 (H)     Alprazolam 05/31/2024 <25     Alpha-Hydroxyalprazolam 05/31/2024 <25     Midazolam 05/31/2024 <25      Alpha-Hydroxymidazolam 05/31/2024 <25     Chlordiazepoxide 05/31/2024 <25     Diazepam 05/31/2024 <25     Nordiazepam 05/31/2024 <25     Temazepam 05/31/2024 <25     Oxazepam 05/31/2024 <25     Lorazepam 05/31/2024 <25     Methadone 05/31/2024 <25     EDDP 05/31/2024 <25     6-Acetylmorphine 05/31/2024 <25     Codeine 05/31/2024 <50     Hydrocodone 05/31/2024 <25     Hydromorphone 05/31/2024 <25     Morphine  05/31/2024 <50     Norhydrocodone 05/31/2024 <25     Noroxycodone 05/31/2024 <25     Oxycodone 05/31/2024 <25     Oxymorphone 05/31/2024 <25     Fentanyl 05/31/2024 <2.5     Norfentanyl 05/31/2024 <2.5     Tramadol 05/31/2024 <50     O-Desmethyltramadol 05/31/2024 <50     Zolpidem 05/31/2024 <25     Zolpidem Metabolite (ZCA) 05/31/2024 <25          Time Spent:    Prep time: 1 min.  Direct patient time: 31 min.  Documentation time: 5 min.  Total time: 37 min.    Next Appointment:  Follow up in 6 weeks (on 7/11/2025).

## 2025-07-11 ENCOUNTER — APPOINTMENT (OUTPATIENT)
Dept: BEHAVIORAL HEALTH | Facility: CLINIC | Age: 60
End: 2025-07-11
Payer: COMMERCIAL

## 2025-07-11 VITALS
SYSTOLIC BLOOD PRESSURE: 112 MMHG | BODY MASS INDEX: 32.93 KG/M2 | RESPIRATION RATE: 16 BRPM | HEART RATE: 79 BPM | TEMPERATURE: 98.2 F | DIASTOLIC BLOOD PRESSURE: 69 MMHG | WEIGHT: 223 LBS

## 2025-07-11 DIAGNOSIS — F33.0 MILD EPISODE OF RECURRENT MAJOR DEPRESSIVE DISORDER: ICD-10-CM

## 2025-07-11 DIAGNOSIS — F42.8 OBSESSIVE THINKING: ICD-10-CM

## 2025-07-11 DIAGNOSIS — F43.10 COMPLEX POSTTRAUMATIC STRESS DISORDER: ICD-10-CM

## 2025-07-11 DIAGNOSIS — F43.0 PANIC ATTACK DUE TO EXCEPTIONAL STRESS: Primary | ICD-10-CM

## 2025-07-11 DIAGNOSIS — F41.0 PANIC ATTACK DUE TO EXCEPTIONAL STRESS: Primary | ICD-10-CM

## 2025-07-11 DIAGNOSIS — F41.1 GAD (GENERALIZED ANXIETY DISORDER): ICD-10-CM

## 2025-07-11 PROCEDURE — 99214 OFFICE O/P EST MOD 30 MIN: CPT | Performed by: NURSE PRACTITIONER

## 2025-07-11 ASSESSMENT — PATIENT HEALTH QUESTIONNAIRE - PHQ9
7. TROUBLE CONCENTRATING ON THINGS, SUCH AS READING THE NEWSPAPER OR WATCHING TELEVISION: NOT AT ALL
3. TROUBLE FALLING OR STAYING ASLEEP OR SLEEPING TOO MUCH: MORE THAN HALF THE DAYS
5. POOR APPETITE OR OVEREATING: MORE THAN HALF THE DAYS
8. MOVING OR SPEAKING SO SLOWLY THAT OTHER PEOPLE COULD HAVE NOTICED. OR THE OPPOSITE, BEING SO FIGETY OR RESTLESS THAT YOU HAVE BEEN MOVING AROUND A LOT MORE THAN USUAL: NOT AT ALL
9. THOUGHTS THAT YOU WOULD BE BETTER OFF DEAD, OR OF HURTING YOURSELF: NOT AT ALL
1. LITTLE INTEREST OR PLEASURE IN DOING THINGS: SEVERAL DAYS
10. IF YOU CHECKED OFF ANY PROBLEMS, HOW DIFFICULT HAVE THESE PROBLEMS MADE IT FOR YOU TO DO YOUR WORK, TAKE CARE OF THINGS AT HOME, OR GET ALONG WITH OTHER PEOPLE: NOT DIFFICULT AT ALL
2. FEELING DOWN, DEPRESSED OR HOPELESS: SEVERAL DAYS
4. FEELING TIRED OR HAVING LITTLE ENERGY: MORE THAN HALF THE DAYS
6. FEELING BAD ABOUT YOURSELF - OR THAT YOU ARE A FAILURE OR HAVE LET YOURSELF OR YOUR FAMILY DOWN: NOT AT ALL

## 2025-07-11 ASSESSMENT — ANXIETY QUESTIONNAIRES
4. TROUBLE RELAXING: MORE THAN HALF THE DAYS
7. FEELING AFRAID AS IF SOMETHING AWFUL MIGHT HAPPEN: SEVERAL DAYS
GAD7 TOTAL SCORE: 11
5. BEING SO RESTLESS THAT IT IS HARD TO SIT STILL: SEVERAL DAYS
IF YOU CHECKED OFF ANY PROBLEMS ON THIS QUESTIONNAIRE, HOW DIFFICULT HAVE THESE PROBLEMS MADE IT FOR YOU TO DO YOUR WORK, TAKE CARE OF THINGS AT HOME, OR GET ALONG WITH OTHER PEOPLE: SOMEWHAT DIFFICULT
1. FEELING NERVOUS, ANXIOUS, OR ON EDGE: MORE THAN HALF THE DAYS
6. BECOMING EASILY ANNOYED OR IRRITABLE: MORE THAN HALF THE DAYS
2. NOT BEING ABLE TO STOP OR CONTROL WORRYING: MORE THAN HALF THE DAYS
3. WORRYING TOO MUCH ABOUT DIFFERENT THINGS: SEVERAL DAYS

## 2025-07-11 ASSESSMENT — PAIN SCALES - GENERAL: PAINLEVEL_OUTOF10: 0-NO PAIN

## 2025-07-11 ASSESSMENT — ENCOUNTER SYMPTOMS
DYSPHORIC MOOD: 1
NERVOUS/ANXIOUS: 1

## 2025-07-11 NOTE — PROGRESS NOTES
"Adult Ambulatory Psychiatry Progress Note      Assessment/Plan     Impression:  Esvin Gabriel is a 60 y.o. male domiciled  (living separately) with 3 adult sons, employed as  who presents for follow up with CC of \"I am good. I did celebrate yesterday my 30 year work Anniversary yesterday. Maybe next year I will retire. I am doing alright otherwise.\"    Plan: Patient was cooperative and while less anxious, indicated he is now ready to start coming down, if not off of Klonopin. Still struggling with his middle son's behaviors, but making sure he is there for his granddaughter (his son's daughter), as he doesn't want her to experience what he went through with his own father who  recently and wasn't a part of his life growing up, and witnessed/experienced fighting between his own parents. Reviewed and agreed to start tapering off of the Klonopin but leave the Cymbalta and treatment plan in place. Needs to sign sign CSA at next appointment. Urine test ordered for Benzodiazapine compliance. Patient is aware.    Medication: Cymbalta 60mg every day. Starts Klonopin 2mg twice daily taper as follows:  Month 1: 2mg AM, 1.50mg PM. Month 2: 1.50mg AM, 1.50mg PM. Month 3: 1.50mg AM, 1.00mg PM. Month 4: 1.00mg AM, 1.00mg PM. Month 5: 1mg AM, 0.5mg PM. Month 6: 0.5mg AM, 0.5mg PM. Month 7: 0.5mg AM, 0.25mg PM. Month 8: 0.25mg twice daily.     Reviewed r/b/a, possible side effects of the medication. Client is aware about the benefit outweighs the risk.     Diagnoses and all orders for this visit:  Panic attack due to exceptional stress  BETTY (generalized anxiety disorder)  Obsessive thinking  Complex posttraumatic stress disorder  Mild episode of recurrent major depressive disorder        Therapy: none    Other: n/a    Patient is reminded that if there is SI to call [phone] and get themselves to the closest ED for evaluation, otherwise contact me for other questions/concerns.     Subjective   HPI:    HPI:   "   Present Illness - anxiety, depression     Characteristics/Recent psychiatric symptoms (pertinent positives and negatives) - reports feeling anxiety is up and feeling more stressed d/t worrying about his granddaughter's emotional well-being, as her father, his middle son who has his own issues (now including increased drinking lately - arguing/yelling with his daughter's mother in front of his granddaughter) is getting worse. Reports as he reflected on his own upbringing where his own father who  4 months ago and he wasn't a part of his life (estranged, neglected), that he wants to insure that he is a part of his granddaughter's life, while her father, his son, isn't being as good of a father to her, and makes sure she doesn't experience what he went through himself. Reports he is making a conscious effort to control his own emotions and feelings about where he is at in his life and is wanting to come off of the Klonopin 'as I realize I have been on it for far too long, and I feel like I was dependent on it and I don't want it. I did talk about this with my eryn over the weekend, about me deciding to retire as well, and I am getting ready to leave.' Reports he is feeling calmer about his decision as of late with being able to also do his counseling as well and working on processing his thoughts with how to do with his son. Reports his depression is doing well if not better. Reports he is making effort with 'putting another a chapter of my life behind me, with putting this stuff (Klonopin) in me.' Reports his wife is herself in [facility] for the past 3 weeks, as she is dealing with binge eating junk food at night in response to emotional eating and dealing with her own issues and supporting her decisions. Reports his appetite is 'ok' if not at times overeating. Reports sleep is more impacted as of late, d/t racing, worrying thoughts about his middle son's behaviors that can impede him from falling  "asleep for about an hour, then will fall asleep, and may be awakened by anxiety about the situation, almost panicked feeling and then will fall asleep again and while sleep totals 8-9 hours a night (works 3rd shift), it is in 4 hour shifts. Reports he loves his 2 granddaughters, and wife and his 3 sons, even though he is making sure to put more and more emotional distance with his middle son and his behaviors as he realizes he can't control him. \"He is the only one who brings the anxiety out of me, as otherwise I would be doing even better. I think I need to  a hobby of fishing.\" Because of sleep is not the best as of late, energy levels are a little lower than usual and feeling more mentally and physically tired but 'overall I do feel like I am getting better, and I am committing myself to getting off of the Klonopin and working through my anxiety.' Admits to some improvement in racing, obsessive, ruminating and at times intrusive thoughts.     Onset/timeframe - 6 weeks  Type - depression, anxiety  Duration - situational  Aggravating and/or relieving factors/triggers - middle son's behaviors, focusing on better boundaries for himself as a result, and working on weaning himself off of the Klonopin  Treatment and treatment changes (new meds, dosage increases or decreases, med compliance, therapy frequency, etc.) (Past and Recent) - Cymbalta 60mg QD, Klonopin 2mg BID.       Issues: Denies SI/HI/AVH currently.         Review of Systems   Psychiatric/Behavioral:  Positive for dysphoric mood. The patient is nervous/anxious.        OARRS:  Adarsh Robles, STEF-CNP on 7/12/2025 12:40 PM  I have personally reviewed the OARRS report for Esvin Gabriel. I have considered the risks of abuse, dependence, addiction and diversion    Is the patient prescribed a combination of a benzodiazepine and opioid?  No    Last Urine Drug Screen / ordered today: Yes  No results found for this or any previous visit (from the past 8760 " hours).    Results are as expected.         Controlled Substance Agreement:  Date of the Last Agreement: 07/11/2025  Reviewed Controlled Substance Agreement including but not limited to the benefits, risks, and alternatives to treatment with a Controlled Substance medication(s).    Benzodiazepines:  What is the patient's goal of therapy? Stable anxiety/panic feeling  Is this being achieved with current treatment? yes    Activities of Daily Living:   Is your overall impression that this patient is benefiting (symptom reduction outweighs side effects) from benzodiazepine therapy? Yes     1. Physical Functioning: Same  2. Family Relationship: Same  3. Social Relationship: Same  4. Mood: Same  5. Sleep Patterns: Better  6. Overall Function: Same    Objective   Mental Status Exam:  General Appearance: Well groomed, appropriate eye contact  Attitude/Behavior: Cooperative  Motor: No psychomotor agitation or retardation, no tremor or other abnormal movements  Speech: Normal rate, volume, prosody  Gait/Station: WFL - Within functional limits  Mood: 'good'  Affect: Euthymic, full-range, Anxious, Congruent with mood and topic of conversation  Thought Process: Linear, goal directed, Flight of ideas  Thought Associations: No loosening of associations  Thought Content: Normal, Other: (comment) (focusing on continued self-care/boundary setting with his middle son and being there for his granddaughter (son's daughter) and working on weaning himself off of the Klonopin)  Perception: No perceptual abnormalities noted  Sensorium: Alert and oriented to person, place, time and situation  Insight: Fair  Judgement: Intact  Cognition: Cognitively intact to conversational testing with respect to attention, orientation, fund of knowledge, recent and remote memory, and language  Testing: N/A    BETTY-7/PHQ-9 scores reviewed: 11, 8 compared to 7, 13 reflecting increased anxiety and some improvement in depression.    Current Medications:  Current  Outpatient Medications on File Prior to Visit   Medication Sig Dispense Refill    doxylamine-DM-acetaminophen (Vicks Nyquil Nighttime Relief) 6.25- mg/15 mL liquid Take 15 mL by mouth as needed at bedtime (cold/flu).      DULoxetine (Cymbalta) 30 mg DR capsule Take 2 capsules (60 mg) by mouth once daily. 180 capsule 3    multivitamin tablet Take 1 tablet by mouth once daily. Equate from [employer]      [DISCONTINUED] clonazePAM (KlonoPIN) 2 mg tablet Take 1 tablet (2 mg) by mouth 2 times a day. Do not fill before June 7, 2025. 60 tablet 1     No current facility-administered medications on file prior to visit.       Lab Review:   No visits with results within 2 Month(s) from this visit.   Latest known visit with results is:   Telemedicine on 05/31/2024   Component Date Value    Creatinine, Urine Random 05/31/2024 188.4     Amphetamine Screen, Urine 05/31/2024 Presumptive Negative     Barbiturate Screen, Urine 05/31/2024 Presumptive Negative     Cannabinoid Screen, Urine 05/31/2024 Presumptive Negative     Cocaine Metabolite Scree* 05/31/2024 Presumptive Negative     PCP Screen, Urine 05/31/2024 Presumptive Negative     Clonazepam 05/31/2024 <25     7-Aminoclonazepam 05/31/2024 232 (H)     Alprazolam 05/31/2024 <25     Alpha-Hydroxyalprazolam 05/31/2024 <25     Midazolam 05/31/2024 <25     Alpha-Hydroxymidazolam 05/31/2024 <25     Chlordiazepoxide 05/31/2024 <25     Diazepam 05/31/2024 <25     Nordiazepam 05/31/2024 <25     Temazepam 05/31/2024 <25     Oxazepam 05/31/2024 <25     Lorazepam 05/31/2024 <25     Methadone 05/31/2024 <25     EDDP 05/31/2024 <25     6-Acetylmorphine 05/31/2024 <25     Codeine 05/31/2024 <50     Hydrocodone 05/31/2024 <25     Hydromorphone 05/31/2024 <25     Morphine  05/31/2024 <50     Norhydrocodone 05/31/2024 <25     Noroxycodone 05/31/2024 <25     Oxycodone 05/31/2024 <25     Oxymorphone 05/31/2024 <25     Fentanyl 05/31/2024 <2.5     Norfentanyl 05/31/2024 <2.5     Tramadol  05/31/2024 <50     O-Desmethyltramadol 05/31/2024 <50     Zolpidem 05/31/2024 <25     Zolpidem Metabolite (ZCA) 05/31/2024 <25        Risk Assessment:  Risk of harm to self: Low Risk -- Risk factors include: No significant risk factors identified on screening Protective factors include:Denies current suicidal ideation and Denies history of suicide attempts     Risk of harm to others: Low Risk - Risk factors include: No significant risk factors identified on screening. Protective factors include: Lack of known history of harm to others  and Lack of known history of violent ideation     Time Spent:    Prep time: 1 min.  Direct patient time: 29 min.  Documentation time: 8 min.  Total time: 38 min.    Next Appointment:  Follow up in 9 weeks (on 9/12/2025).

## 2025-07-12 RX ORDER — CLONAZEPAM 2 MG/1
2 TABLET ORAL SEE ADMIN INSTRUCTIONS
Qty: 90 TABLET | Refills: 3 | Status: SHIPPED | OUTPATIENT
Start: 2025-07-12 | End: 2025-10-10

## 2025-07-12 ASSESSMENT — PATIENT HEALTH QUESTIONNAIRE - PHQ9: 1. LITTLE INTEREST OR PLEASURE IN DOING THINGS: NOT AT ALL

## 2025-07-15 ENCOUNTER — TELEPHONE (OUTPATIENT)
Dept: BEHAVIORAL HEALTH | Facility: CLINIC | Age: 60
End: 2025-07-15
Payer: COMMERCIAL

## 2025-07-15 DIAGNOSIS — F41.1 GAD (GENERALIZED ANXIETY DISORDER): ICD-10-CM

## 2025-07-15 DIAGNOSIS — F43.0 PANIC ATTACK DUE TO EXCEPTIONAL STRESS: Primary | ICD-10-CM

## 2025-07-15 DIAGNOSIS — F41.0 PANIC ATTACK DUE TO EXCEPTIONAL STRESS: Primary | ICD-10-CM

## 2025-07-15 RX ORDER — CLONAZEPAM 1 MG/1
1.5 TABLET ORAL NIGHTLY
Qty: 45 TABLET | Refills: 0 | Status: SHIPPED | OUTPATIENT
Start: 2025-07-15 | End: 2025-08-14

## 2025-07-15 RX ORDER — CLONAZEPAM 2 MG/1
2 TABLET ORAL
Qty: 30 TABLET | Refills: 0 | Status: SHIPPED | OUTPATIENT
Start: 2025-07-15 | End: 2025-08-14

## 2025-07-15 NOTE — PROGRESS NOTES
[facility] calling in. The directions needs clarification on the medication below. Please send over new RX      clonazePAM (KlonoPIN) 2 mg tablet

## 2025-07-15 NOTE — PROGRESS NOTES
Rewriting Klonopin script for pharmacy per request. Over 3 month breakdown: Sent over first month script only.    Klonopin taper from 2mg twice daily:   Month 1: 2mg in the AM, 1.50mg in the PM. 30 days only 7/15/2025  Month 2: 1.50mg AM, 1.50mg PM. 30 days 30 days 8/14/2025  Month 3: 1.50mg AM, 1.00mg PM. 30 days 09/132025

## 2025-07-19 DIAGNOSIS — F41.0 PANIC ATTACK DUE TO EXCEPTIONAL STRESS: ICD-10-CM

## 2025-07-19 DIAGNOSIS — F43.0 PANIC ATTACK DUE TO EXCEPTIONAL STRESS: ICD-10-CM

## 2025-08-11 DIAGNOSIS — F41.0 PANIC ATTACK DUE TO EXCEPTIONAL STRESS: Primary | ICD-10-CM

## 2025-08-11 DIAGNOSIS — F41.1 GAD (GENERALIZED ANXIETY DISORDER): ICD-10-CM

## 2025-08-11 DIAGNOSIS — F43.0 PANIC ATTACK DUE TO EXCEPTIONAL STRESS: Primary | ICD-10-CM

## 2025-08-11 RX ORDER — CLONAZEPAM 1 MG/1
1.5 TABLET ORAL 2 TIMES DAILY
Qty: 90 TABLET | Refills: 0 | Status: SHIPPED | OUTPATIENT
Start: 2025-08-11 | End: 2025-09-10

## 2025-08-13 DIAGNOSIS — F41.1 GAD (GENERALIZED ANXIETY DISORDER): Primary | ICD-10-CM

## 2025-08-13 DIAGNOSIS — F42.8 OBSESSIVE THINKING: ICD-10-CM

## 2025-08-13 DIAGNOSIS — F43.10 COMPLEX POSTTRAUMATIC STRESS DISORDER: ICD-10-CM

## 2025-08-13 DIAGNOSIS — F33.0 MILD EPISODE OF RECURRENT MAJOR DEPRESSIVE DISORDER: ICD-10-CM

## 2025-08-13 RX ORDER — DULOXETIN HYDROCHLORIDE 30 MG/1
60 CAPSULE, DELAYED RELEASE ORAL DAILY
Qty: 180 CAPSULE | Refills: 3 | Status: SHIPPED | OUTPATIENT
Start: 2025-08-13 | End: 2026-08-13

## 2025-09-12 ENCOUNTER — APPOINTMENT (OUTPATIENT)
Dept: BEHAVIORAL HEALTH | Facility: CLINIC | Age: 60
End: 2025-09-12
Payer: COMMERCIAL